# Patient Record
Sex: FEMALE | Race: WHITE | NOT HISPANIC OR LATINO | URBAN - METROPOLITAN AREA
[De-identification: names, ages, dates, MRNs, and addresses within clinical notes are randomized per-mention and may not be internally consistent; named-entity substitution may affect disease eponyms.]

---

## 2018-06-29 ENCOUNTER — INPATIENT (INPATIENT)
Facility: HOSPITAL | Age: 83
LOS: 3 days | Discharge: HOME | DRG: 690 | End: 2018-07-03
Attending: INTERNAL MEDICINE | Admitting: HOSPITALIST
Payer: MEDICARE

## 2018-06-29 VITALS
DIASTOLIC BLOOD PRESSURE: 93 MMHG | SYSTOLIC BLOOD PRESSURE: 173 MMHG | RESPIRATION RATE: 18 BRPM | TEMPERATURE: 98 F | OXYGEN SATURATION: 95 % | HEART RATE: 100 BPM

## 2018-06-29 DIAGNOSIS — Z96.60 PRESENCE OF UNSPECIFIED ORTHOPEDIC JOINT IMPLANT: Chronic | ICD-10-CM

## 2018-06-29 DIAGNOSIS — S22.000A WEDGE COMPRESSION FRACTURE OF UNSPECIFIED THORACIC VERTEBRA, INITIAL ENCOUNTER FOR CLOSED FRACTURE: ICD-10-CM
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
## 2018-06-29 DIAGNOSIS — S22.009A UNSPECIFIED FRACTURE OF UNSPECIFIED THORACIC VERTEBRA, INITIAL ENCOUNTER FOR CLOSED FRACTURE: ICD-10-CM

## 2018-06-29 DIAGNOSIS — Z29.9 ENCOUNTER FOR PROPHYLACTIC MEASURES, UNSPECIFIED: ICD-10-CM

## 2018-06-29 DIAGNOSIS — K21.9 GASTRO-ESOPHAGEAL REFLUX DISEASE WITHOUT ESOPHAGITIS: ICD-10-CM

## 2018-06-29 DIAGNOSIS — G31.84 MILD COGNITIVE IMPAIRMENT OF UNCERTAIN OR UNKNOWN ETIOLOGY: ICD-10-CM

## 2018-06-29 DIAGNOSIS — N39.0 URINARY TRACT INFECTION, SITE NOT SPECIFIED: ICD-10-CM

## 2018-06-29 DIAGNOSIS — D32.9 BENIGN NEOPLASM OF MENINGES, UNSPECIFIED: ICD-10-CM

## 2018-06-29 DIAGNOSIS — Z90.710 ACQUIRED ABSENCE OF BOTH CERVIX AND UTERUS: Chronic | ICD-10-CM

## 2018-06-29 DIAGNOSIS — Z98.89 OTHER SPECIFIED POSTPROCEDURAL STATES: Chronic | ICD-10-CM

## 2018-06-29 LAB
ALBUMIN SERPL ELPH-MCNC: 4.4 G/DL — SIGNIFICANT CHANGE UP (ref 3.3–5)
ALP SERPL-CCNC: 97 U/L — SIGNIFICANT CHANGE UP (ref 40–120)
ALT FLD-CCNC: 15 U/L — SIGNIFICANT CHANGE UP (ref 10–45)
ANION GAP SERPL CALC-SCNC: 15 MMOL/L — SIGNIFICANT CHANGE UP (ref 5–17)
APPEARANCE UR: ABNORMAL
APTT BLD: 28.4 SEC — SIGNIFICANT CHANGE UP (ref 27.5–37.4)
AST SERPL-CCNC: 34 U/L — SIGNIFICANT CHANGE UP (ref 10–40)
BILIRUB SERPL-MCNC: 0.6 MG/DL — SIGNIFICANT CHANGE UP (ref 0.2–1.2)
BILIRUB UR-MCNC: NEGATIVE — SIGNIFICANT CHANGE UP
BUN SERPL-MCNC: 10 MG/DL — SIGNIFICANT CHANGE UP (ref 7–23)
CALCIUM SERPL-MCNC: 9.4 MG/DL — SIGNIFICANT CHANGE UP (ref 8.4–10.5)
CHLORIDE SERPL-SCNC: 99 MMOL/L — SIGNIFICANT CHANGE UP (ref 96–108)
CO2 SERPL-SCNC: 22 MMOL/L — SIGNIFICANT CHANGE UP (ref 22–31)
COLOR SPEC: SIGNIFICANT CHANGE UP
CREAT SERPL-MCNC: 0.83 MG/DL — SIGNIFICANT CHANGE UP (ref 0.5–1.3)
DIFF PNL FLD: NEGATIVE — SIGNIFICANT CHANGE UP
GLUCOSE SERPL-MCNC: 143 MG/DL — HIGH (ref 70–99)
GLUCOSE UR QL: 50 MG/DL
HCT VFR BLD CALC: 43.8 % — SIGNIFICANT CHANGE UP (ref 34.5–45)
HGB BLD-MCNC: 14.5 G/DL — SIGNIFICANT CHANGE UP (ref 11.5–15.5)
INR BLD: 1.01 RATIO — SIGNIFICANT CHANGE UP (ref 0.88–1.16)
KETONES UR-MCNC: NEGATIVE — SIGNIFICANT CHANGE UP
LEUKOCYTE ESTERASE UR-ACNC: ABNORMAL
MCHC RBC-ENTMCNC: 32.6 PG — SIGNIFICANT CHANGE UP (ref 27–34)
MCHC RBC-ENTMCNC: 33 GM/DL — SIGNIFICANT CHANGE UP (ref 32–36)
MCV RBC AUTO: 98.7 FL — SIGNIFICANT CHANGE UP (ref 80–100)
NITRITE UR-MCNC: NEGATIVE — SIGNIFICANT CHANGE UP
PH UR: 7 — SIGNIFICANT CHANGE UP (ref 5–8)
PLATELET # BLD AUTO: 252 K/UL — SIGNIFICANT CHANGE UP (ref 150–400)
POTASSIUM SERPL-MCNC: 5.4 MMOL/L — HIGH (ref 3.5–5.3)
POTASSIUM SERPL-SCNC: 5.4 MMOL/L — HIGH (ref 3.5–5.3)
PROT SERPL-MCNC: 7.6 G/DL — SIGNIFICANT CHANGE UP (ref 6–8.3)
PROT UR-MCNC: NEGATIVE — SIGNIFICANT CHANGE UP
PROTHROM AB SERPL-ACNC: 10.9 SEC — SIGNIFICANT CHANGE UP (ref 9.8–12.7)
RBC # BLD: 4.44 M/UL — SIGNIFICANT CHANGE UP (ref 3.8–5.2)
RBC # FLD: 12.7 % — SIGNIFICANT CHANGE UP (ref 10.3–14.5)
SODIUM SERPL-SCNC: 136 MMOL/L — SIGNIFICANT CHANGE UP (ref 135–145)
SP GR SPEC: 1.01 — SIGNIFICANT CHANGE UP (ref 1.01–1.02)
UROBILINOGEN FLD QL: NEGATIVE — SIGNIFICANT CHANGE UP
WBC # BLD: 13.3 K/UL — HIGH (ref 3.8–10.5)
WBC # FLD AUTO: 13.3 K/UL — HIGH (ref 3.8–10.5)

## 2018-06-29 PROCEDURE — 99285 EMERGENCY DEPT VISIT HI MDM: CPT | Mod: 25

## 2018-06-29 PROCEDURE — 71250 CT THORAX DX C-: CPT | Mod: 26

## 2018-06-29 PROCEDURE — 71045 X-RAY EXAM CHEST 1 VIEW: CPT | Mod: 26

## 2018-06-29 PROCEDURE — 93010 ELECTROCARDIOGRAM REPORT: CPT

## 2018-06-29 PROCEDURE — 70450 CT HEAD/BRAIN W/O DYE: CPT | Mod: 26

## 2018-06-29 PROCEDURE — 72170 X-RAY EXAM OF PELVIS: CPT | Mod: 26

## 2018-06-29 PROCEDURE — 99223 1ST HOSP IP/OBS HIGH 75: CPT

## 2018-06-29 PROCEDURE — 72125 CT NECK SPINE W/O DYE: CPT | Mod: 26

## 2018-06-29 RX ORDER — LANOLIN ALCOHOL/MO/W.PET/CERES
5 CREAM (GRAM) TOPICAL AT BEDTIME
Qty: 0 | Refills: 0 | Status: DISCONTINUED | OUTPATIENT
Start: 2018-06-29 | End: 2018-07-03

## 2018-06-29 RX ORDER — FLUCONAZOLE 150 MG/1
200 TABLET ORAL DAILY
Qty: 0 | Refills: 0 | Status: DISCONTINUED | OUTPATIENT
Start: 2018-06-29 | End: 2018-07-01

## 2018-06-29 RX ORDER — LACTOBACILLUS ACIDOPHILUS 100MM CELL
1 CAPSULE ORAL DAILY
Qty: 0 | Refills: 0 | Status: DISCONTINUED | OUTPATIENT
Start: 2018-06-29 | End: 2018-07-03

## 2018-06-29 RX ORDER — SODIUM CHLORIDE 9 MG/ML
1000 INJECTION INTRAMUSCULAR; INTRAVENOUS; SUBCUTANEOUS ONCE
Qty: 0 | Refills: 0 | Status: COMPLETED | OUTPATIENT
Start: 2018-06-29 | End: 2018-06-29

## 2018-06-29 RX ORDER — OXYCODONE HYDROCHLORIDE 5 MG/1
5 TABLET ORAL ONCE
Qty: 0 | Refills: 0 | Status: DISCONTINUED | OUTPATIENT
Start: 2018-06-29 | End: 2018-06-29

## 2018-06-29 RX ORDER — FLUCONAZOLE 150 MG/1
200 TABLET ORAL ONCE
Qty: 0 | Refills: 0 | Status: COMPLETED | OUTPATIENT
Start: 2018-06-29 | End: 2018-06-29

## 2018-06-29 RX ORDER — ACETAMINOPHEN 500 MG
1000 TABLET ORAL ONCE
Qty: 0 | Refills: 0 | Status: COMPLETED | OUTPATIENT
Start: 2018-06-29 | End: 2018-06-29

## 2018-06-29 RX ORDER — ESOMEPRAZOLE MAGNESIUM 40 MG/1
0 CAPSULE, DELAYED RELEASE ORAL
Qty: 0 | Refills: 0 | COMMUNITY

## 2018-06-29 RX ORDER — OXYCODONE HYDROCHLORIDE 5 MG/1
5 TABLET ORAL EVERY 6 HOURS
Qty: 0 | Refills: 0 | Status: DISCONTINUED | OUTPATIENT
Start: 2018-06-29 | End: 2018-06-30

## 2018-06-29 RX ORDER — ENOXAPARIN SODIUM 100 MG/ML
40 INJECTION SUBCUTANEOUS EVERY 24 HOURS
Qty: 0 | Refills: 0 | Status: DISCONTINUED | OUTPATIENT
Start: 2018-06-29 | End: 2018-07-03

## 2018-06-29 RX ORDER — CEFTRIAXONE 500 MG/1
1 INJECTION, POWDER, FOR SOLUTION INTRAMUSCULAR; INTRAVENOUS EVERY 24 HOURS
Qty: 0 | Refills: 0 | Status: DISCONTINUED | OUTPATIENT
Start: 2018-06-29 | End: 2018-07-03

## 2018-06-29 RX ORDER — CEFTRIAXONE 500 MG/1
1 INJECTION, POWDER, FOR SOLUTION INTRAMUSCULAR; INTRAVENOUS ONCE
Qty: 0 | Refills: 0 | Status: COMPLETED | OUTPATIENT
Start: 2018-06-29 | End: 2018-06-29

## 2018-06-29 RX ORDER — ACETAMINOPHEN 500 MG
650 TABLET ORAL EVERY 6 HOURS
Qty: 0 | Refills: 0 | Status: DISCONTINUED | OUTPATIENT
Start: 2018-06-29 | End: 2018-07-03

## 2018-06-29 RX ORDER — PANTOPRAZOLE SODIUM 20 MG/1
40 TABLET, DELAYED RELEASE ORAL
Qty: 0 | Refills: 0 | Status: DISCONTINUED | OUTPATIENT
Start: 2018-06-29 | End: 2018-07-03

## 2018-06-29 RX ADMIN — CEFTRIAXONE 100 GRAM(S): 500 INJECTION, POWDER, FOR SOLUTION INTRAMUSCULAR; INTRAVENOUS at 17:40

## 2018-06-29 RX ADMIN — SODIUM CHLORIDE 1000 MILLILITER(S): 9 INJECTION INTRAMUSCULAR; INTRAVENOUS; SUBCUTANEOUS at 17:43

## 2018-06-29 RX ADMIN — Medication 650 MILLIGRAM(S): at 22:30

## 2018-06-29 RX ADMIN — Medication 400 MILLIGRAM(S): at 17:30

## 2018-06-29 RX ADMIN — Medication 5 MILLIGRAM(S): at 21:41

## 2018-06-29 RX ADMIN — Medication 650 MILLIGRAM(S): at 21:42

## 2018-06-29 RX ADMIN — Medication 1000 MILLIGRAM(S): at 18:05

## 2018-06-29 RX ADMIN — OXYCODONE HYDROCHLORIDE 5 MILLIGRAM(S): 5 TABLET ORAL at 20:22

## 2018-06-29 RX ADMIN — FLUCONAZOLE 200 MILLIGRAM(S): 150 TABLET ORAL at 17:52

## 2018-06-29 NOTE — ED PROVIDER NOTE - ENMT, MLM
Airway patent, Nasal mucosa clear. Mouth with normal mucosa. Throat has no vesicles, no oropharyngeal exudates and uvula is midline. NO scalp tenderness, no hematoma, no ecchymosis. No C-spine tenderness Airway patent, Nasal mucosa clear. Mouth with normal mucosa. No tongue laceration. Throat has no vesicles, no oropharyngeal exudates and uvula is midline. NO scalp tenderness, no hematoma, no ecchymosis. No C-spine tenderness
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
## 2018-06-29 NOTE — H&P ADULT - PROBLEM SELECTOR PLAN 3
- ED discussed incidental finding of meningioma with neurosurgical team. Team determined there was no need for acute neurosurgical intervention  - follow up neurosurgical recommendations?? - CT head, neck did not reveal ICH or fracture. CT chest identified a compression fracture at T9, age indeterminate  - follow up orthopedic recommendations regarding benefit for brace to address pain   - start fall precautions, bed alarm to reduce risk of falls - CT head, neck did not reveal ICH or fracture. CT chest identified a compression fracture at T9, age indeterminate  - will consult patient's orthopedic surgeon for recommendations regarding benefit for brace to address pain   - start calcium and vitamin D  - start fall precautions, bed alarm to reduce risk of falls  - patient should have DEXA scan to determine level of bone mineralization, she may be a candidate for bisphosphonate therapy - CT head, neck did not reveal ICH or fracture. CT chest identified a compression fracture at T9, age indeterminate  - will start lidocaine patches for pain, WBAT, and check for pain, if severe- will have orthopedic team assess for need for brace  - start calcium and vitamin D  - start fall precautions, bed alarm to reduce risk of falls  - patient should have DEXA scan as outpatient to determine level of bone mineralization, she may be a candidate for bisphosphonate therapy

## 2018-06-29 NOTE — H&P ADULT - NSHPPHYSICALEXAM_GEN_ALL_CORE
T(C): 36.9 (06-29-18 @ 20:55), Max: 37.2 (06-29-18 @ 19:31)  HR: 82 (06-29-18 @ 20:55) (77 - 100)  BP: 170/91 (06-29-18 @ 20:55) (166/79 - 192/93)  RR: 18 (06-29-18 @ 20:55) (17 - 22)  SpO2: 95% (06-29-18 @ 20:55) (95% - 97%)  Wt(kg): --Vital Signs Last 24 Hrs  T(C): 36.9 (29 Jun 2018 20:55), Max: 37.2 (29 Jun 2018 19:31)  T(F): 98.4 (29 Jun 2018 20:55), Max: 98.9 (29 Jun 2018 19:31)  HR: 82 (29 Jun 2018 20:55) (77 - 100)  BP: 170/91 (29 Jun 2018 20:55) (166/79 - 192/93)  BP(mean): --  RR: 18 (29 Jun 2018 20:55) (17 - 22)  SpO2: 95% (29 Jun 2018 20:55) (95% - 97%)    PHYSICAL EXAM:  GENERAL: NAD, well-groomed, well-developed  HEAD:  Atraumatic, Normocephalic  EYES: EOMI, PERRLA, conjunctiva and sclera clear  ENMT: No oropharyngeal lesions, Moist mucous membranes, Good dentition, No lesions  NECK: Supple, no cervical lymphadenopathy  NERVOUS SYSTEM:  Alert & Oriented X name, year, location (hospital, president), Motor Strength 5/5 B/L upper and lower extremities;  full sensation to light touch  CHEST/LUNG: Clear to percussion bilaterally; No rales, rhonchi, wheezing, or rubs  HEART: Regular rate and rhythm; No murmurs, rubs, or gallops  ABDOMEN: Soft, Nontender, Nondistended; Bowel sounds present  EXTREMITIES:  2+ Peripheral Pulses, No clubbing, cyanosis, or edema  MSK: no cervical tenderness, mild chest wall tenderness to palpation, no step-off or focal tenderness of the spine, significant discomfort with flexion of L flexors, normal ROM of RLE,

## 2018-06-29 NOTE — H&P ADULT - PROBLEM SELECTOR PLAN 1
Patient has a positive UA. Patient has no history of recurrent UTI, recent instrumentation to suggest risk for complicated UTI.   - continue ceftriaxone 1g IV qd  - follow up Ucx results  - start probiotic Patient has a positive UA. Patient has no history of recurrent UTI, recent instrumentation to suggest risk for complicated UTI.   Given that the patient is currently symptomatic and was found to have budding yeast-like cells in UA, will continue coverage with fluconazole  - continue ceftriaxone 1g IV qd, fluconazole 200mg PO qd  - follow up Ucx results  - start probiotic

## 2018-06-29 NOTE — ED PROVIDER NOTE - ATTENDING CONTRIBUTION TO CARE
Attending MD Castaneda: I personally have seen and examined this patient.  Resident note reviewed and agree on plan of care and except where noted.  See below for details.     Daughter, RN, contributing to HPI    89F with PMH including GERD, "cognitive decline" (?dementia) presents to the ED with chest and back pain after fall.  Reports was opening refrigerator when cookie box fell and she fell backwards onto wood floor and hit head.  Unclear if there was loss of balance prior to fall of box.  Reports she was placed in chair by family, unable to ambulate since fall.  Denies LOC, headache.  Denies shortness of breath.  Denies abdominal pain, nausea, vomiting, diarrhea.  Denies fevers, chills, recent illness.  Patient minimally contributor to history, reports secondary to pain.  On exam, head NCAT, PERRL, FROM at neck, diffuse tenderness to palpation or stepoffs along length of spine, lungs CTAB with good inspiratory effort, +S1S2, no m/r/g, abdomen soft with +BS, NT, ND, no CVAT, moving all extremities; A/P: 89F with fall, ?mechanical vs underlying infection, will obtain CT head, CT C spine, CT Chest, CXR, pelvis, labs, UA, IVFs, reassess

## 2018-06-29 NOTE — ED PROVIDER NOTE - MEDICAL DECISION MAKING DETAILS
89 yo PMH GERD and cognitive decline presenting with chest and back pain in the setting of an unwitnessed mechanical fall without LOC. Patient without neuro deficits, no headache, no head pain. EKG without ST changes. CXR shows clear lungs. Chest pain likely musculoskeletal in nature, less likely cardiac related given unremarkable EKG and CXR. Awaiting CT head, neck, and chest for further evaluation. CBC shows WBC 13.3. UA and urine culture sent.

## 2018-06-29 NOTE — H&P ADULT - NSHPSOCIALHISTORY_GEN_ALL_CORE
This patient lives with her daughter and son-in-law.  She has no history of tobacco or illicit drug use.  She drinks EtOH on rare occasions, socially.
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
## 2018-06-29 NOTE — ED ADULT NURSE NOTE - PSH
S/P hip replacement  right  S/P ORIF (open reduction internal fixation) fracture  L hip, s/p fracture  S/P TUAN-BSO (total abdominal hysterectomy and bilateral salpingo-oophorectomy)

## 2018-06-29 NOTE — H&P ADULT - PROBLEM SELECTOR PLAN 4
- lovenox subQ - ED discussed incidental finding of meningioma with neurosurgical team. Team determined there was no need for acute neurosurgical intervention  - follow up neurosurgical recommendations - ED team, Dr. Castaneda discussed incidental finding of meningioma with neurosurgical team. Because of the meningioma's calcified appearance and lack of neurologic symptoms, no acute neurosurgical intervention required at this time. Findings were discussed with the patient and her daughter.  The patient is advised to follow up with her primary care doctor for repeat imaging.

## 2018-06-29 NOTE — ED PROVIDER NOTE - PROGRESS NOTE DETAILS
Attending MD Castaneda: SPoke with neurosurgery, Dr. Gulshan Ferrera, re: parafalcine meningioma, images reviewed, appears calcified, given lack of neuro symptoms, no acute neurosurgical intervention Attending MD Castaneda: Call placed to Dr. Bhavin Gardner [phone] Attending MD Castaneda: Spoke with Dr. Gardner, reports patient rarely seeks hospital care, agrees with concern, will admit patient to hospitalist. Hospitalist paged.

## 2018-06-29 NOTE — H&P ADULT - NSHPREVIEWOFSYSTEMS_GEN_ALL_CORE
REVIEW OF SYSTEMS  CONSTITUTIONAL: No fever, no chills  EYES: No eye pain, does not wear glasses  ENMT:  No difficulty hearing, No throat pain  NECK: No pain or stiffness  RESPIRATORY: No cough, no wheezing, no chills, No shortness of breath  CARDIOVASCULAR: + chest pain with deep inhalation, no palpitations, no leg swelling  GASTROINTESTINAL: No abdominal or epigastric pain. No nausea, vomiting, No diarrhea or constipation.   GENITOURINARY: No dysuria, + frequency, No hematuria, +malodorous urine  NEUROLOGICAL: No headaches, no numbness, no tremors  SKIN: No itching, burning, rashes, or lesions   MUSCULOSKELETAL: + Back pain  HEME/LYMPH: No bruising or bleeding

## 2018-06-29 NOTE — ED ADULT NURSE NOTE - OBJECTIVE STATEMENT
Patient presents to Ed via amb with c/o fall. Patient reports she was opening her refrigerator when a box of fell off  the top and caused her to fall backwards hitting the side of her head. Patient A&Ox3 denies loc, c/o chest and back   pain no sob no nausea or vomiting no fever chills headache or dizziness. Full strength and sensation to b/l upper and  lower ext, no visible open wounds. RAC 20g iv lock placed labs drawn and sent.
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
## 2018-06-29 NOTE — ED PROVIDER NOTE - MUSCULOSKELETAL, MLM
Spine appears normal, range of motion is not limited. No c-spine tenderness, no midline back tenderness

## 2018-06-29 NOTE — ED PROVIDER NOTE - OBJECTIVE STATEMENT
89 yo F PMH GERD, cognitive decline presenting with chest and back pain s/p mechanical fall. Pt was opening fridge door and startled by falling cookie box and fell backwards onto wood floor and hit head. No LOC 89 yo F PMH GERD, cognitive decline presenting with chest and back pain s/p mechanical fall. Pt was opening fridge door and startled by falling cookie box and fell backwards onto wood floor and hit head. No LOC, no headache, no dizziness, SOB. No prior dizziness, lightheadedness, palpitations prior to fall. Pt transferred to chair by family but unable to get up from chair due to chest and back pain. Pain worsens with movement and touch. no abd pain, nausea or vomiting.
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
## 2018-06-29 NOTE — H&P ADULT - NSHPLABSRESULTS_GEN_ALL_CORE
LABS:                        14.5   13.3  )-----------( 252      ( 2018 14:42 )             43.8         136  |  99  |  10  ----------------------------<  143<H>  5.4<H>   |  22  |  0.83    Ca    9.4      2018 14:42    TPro  7.6  /  Alb  4.4  /  TBili  0.6  /  DBili  x   /  AST  34  /  ALT  15  /  AlkPhos  97      PT/INR - ( 2018 14:42 )   PT: 10.9 sec;   INR: 1.01 ratio         PTT - ( 2018 14:42 )  PTT:28.4 sec  Urinalysis Basic - ( 2018 15:10 )    Color: PL Yellow / Appearance: SL Turbid / S.011 / pH: x  Gluc: x / Ketone: Negative  / Bili: Negative / Urobili: Negative   Blood: x / Protein: Negative / Nitrite: Negative   Leuk Esterase: Large / RBC: 2-5 /HPF / WBC >50 /HPF   Sq Epi: x / Non Sq Epi: Occasional /HPF / Bacteria: Few /HPF      CAPILLARY BLOOD GLUCOSE      Urinalysis Basic - ( 2018 15:10 )    Color: PL Yellow / Appearance: SL Turbid / S.011 / pH: x  Gluc: x / Ketone: Negative  / Bili: Negative / Urobili: Negative   Blood: x / Protein: Negative / Nitrite: Negative   Leuk Esterase: Large / RBC: 2-5 /HPF / WBC >50 /HPF   Sq Epi: x / Non Sq Epi: Occasional /HPF / Bacteria: Few /HPF      RADIOLOGY & ADDITIONAL TESTS:    Imaging Personally Reviewed:  [ x] YES  [ ] NO    CBC is notable for elevated WBC of 13.3, with neutrophil predominance. INR, PT and PTT are within normal limits. CMP is unremarkable except for slightly elevated K of 5.4, found to be from a hemolyzed sample. UA is positive for leukocyte esterase, WBC and bacteria, with few budding yeast-like cells.     SEE full report CXR found normal heart size, clear lungs, no pneumothorax.  Xray pelvis- no displaced fracture, chornic deformities seen. Arthrosis noted. Lower lumbar spondylosis seen. Astherosclerosis seen.    CT head found 1.3x 0.8cm L paracfalcine meningioma noted. No ICH or calvarial fracture. No  C-spine fracture or subluxation.     CT chest found compression fracture T9, age indeterminate. Bibasilar subsegmental atelectasis is seen. LABS:                        14.5   13.3  )-----------( 252      ( 2018 14:42 )             43.8         136  |  99  |  10  ----------------------------<  143<H>  5.4<H>   |  22  |  0.83    Ca    9.4      2018 14:42    TPro  7.6  /  Alb  4.4  /  TBili  0.6  /  DBili  x   /  AST  34  /  ALT  15  /  AlkPhos  97      PT/INR - ( 2018 14:42 )   PT: 10.9 sec;   INR: 1.01 ratio         PTT - ( 2018 14:42 )  PTT:28.4 sec  Urinalysis Basic - ( 2018 15:10 )    Color: PL Yellow / Appearance: SL Turbid / S.011 / pH: x  Gluc: x / Ketone: Negative  / Bili: Negative / Urobili: Negative   Blood: x / Protein: Negative / Nitrite: Negative   Leuk Esterase: Large / RBC: 2-5 /HPF / WBC >50 /HPF   Sq Epi: x / Non Sq Epi: Occasional /HPF / Bacteria: Few /HPF      CAPILLARY BLOOD GLUCOSE      Urinalysis Basic - ( 2018 15:10 )    Color: PL Yellow / Appearance: SL Turbid / S.011 / pH: x  Gluc: x / Ketone: Negative  / Bili: Negative / Urobili: Negative   Blood: x / Protein: Negative / Nitrite: Negative   Leuk Esterase: Large / RBC: 2-5 /HPF / WBC >50 /HPF   Sq Epi: x / Non Sq Epi: Occasional /HPF / Bacteria: Few /HPF      RADIOLOGY & ADDITIONAL TESTS:    Imaging Personally Reviewed:  [ x] YES  [ ] NO    CBC is notable for elevated WBC of 13.3, with neutrophil predominance. INR, PT and PTT are within normal limits. CMP is unremarkable except for slightly elevated K of 5.4, found to be from a hemolyzed sample. UA is positive for leukocyte esterase, WBC and bacteria, with few budding yeast-like cells.   CXR found normal heart size, clear lungs, no pneumothorax.  Xray pelvis- no displaced fracture, chronic deformities seen. Arthrosis noted. Lower lumbar spondylosis seen. Astherosclerosis seen.    CT head found 1.3x 0.8cm L parafalcine meningioma noted. No ICH or calvarial fracture. No  C-spine fracture or subluxation.     CT chest found compression fracture T9, age indeterminate. Bibasilar subsegmental atelectasis is seen.    EKG NSR , with TWI in III, left axis deviation

## 2018-06-29 NOTE — H&P ADULT - PROBLEM SELECTOR PLAN 2
- CT head, neck did not reveal ICH or fracture. CT chest identified a compression fracture at T9, age indeterminate  - PT consult in AM   - start fall precautions, bed alarm to reduce risk of falls - manage pain with oxycodone IR 5mg for severe pain, tylenol for mild-moderate pain  - PT consult in AM   - start fall precautions, bed alarm to reduce risk of falls - manage pain with oxycodone IR 5mg for severe pain, tylenol for mild-moderate pain  - start fall precautions, bed alarm to reduce risk of falls

## 2018-06-30 LAB
ANION GAP SERPL CALC-SCNC: 16 MMOL/L — SIGNIFICANT CHANGE UP (ref 5–17)
BUN SERPL-MCNC: 9 MG/DL — SIGNIFICANT CHANGE UP (ref 7–23)
CALCIUM SERPL-MCNC: 9.4 MG/DL — SIGNIFICANT CHANGE UP (ref 8.4–10.5)
CHLORIDE SERPL-SCNC: 99 MMOL/L — SIGNIFICANT CHANGE UP (ref 96–108)
CO2 SERPL-SCNC: 23 MMOL/L — SIGNIFICANT CHANGE UP (ref 22–31)
CREAT SERPL-MCNC: 0.82 MG/DL — SIGNIFICANT CHANGE UP (ref 0.5–1.3)
GLUCOSE SERPL-MCNC: 128 MG/DL — HIGH (ref 70–99)
HCT VFR BLD CALC: 42.9 % — SIGNIFICANT CHANGE UP (ref 34.5–45)
HGB BLD-MCNC: 13.7 G/DL — SIGNIFICANT CHANGE UP (ref 11.5–15.5)
MCHC RBC-ENTMCNC: 31.1 PG — SIGNIFICANT CHANGE UP (ref 27–34)
MCHC RBC-ENTMCNC: 31.9 GM/DL — LOW (ref 32–36)
MCV RBC AUTO: 97.5 FL — SIGNIFICANT CHANGE UP (ref 80–100)
PLATELET # BLD AUTO: 250 K/UL — SIGNIFICANT CHANGE UP (ref 150–400)
POTASSIUM SERPL-MCNC: 4.7 MMOL/L — SIGNIFICANT CHANGE UP (ref 3.5–5.3)
POTASSIUM SERPL-SCNC: 4.7 MMOL/L — SIGNIFICANT CHANGE UP (ref 3.5–5.3)
RBC # BLD: 4.4 M/UL — SIGNIFICANT CHANGE UP (ref 3.8–5.2)
RBC # FLD: 14.9 % — HIGH (ref 10.3–14.5)
SODIUM SERPL-SCNC: 138 MMOL/L — SIGNIFICANT CHANGE UP (ref 135–145)
WBC # BLD: 13.22 K/UL — HIGH (ref 3.8–10.5)
WBC # FLD AUTO: 13.22 K/UL — HIGH (ref 3.8–10.5)

## 2018-06-30 PROCEDURE — 99233 SBSQ HOSP IP/OBS HIGH 50: CPT

## 2018-06-30 RX ORDER — DOCUSATE SODIUM 100 MG
100 CAPSULE ORAL THREE TIMES A DAY
Qty: 0 | Refills: 0 | Status: DISCONTINUED | OUTPATIENT
Start: 2018-06-30 | End: 2018-07-03

## 2018-06-30 RX ORDER — CALCIUM CARBONATE 500(1250)
1 TABLET ORAL DAILY
Qty: 0 | Refills: 0 | Status: DISCONTINUED | OUTPATIENT
Start: 2018-06-30 | End: 2018-07-03

## 2018-06-30 RX ORDER — LIDOCAINE 4 G/100G
1 CREAM TOPICAL DAILY
Qty: 0 | Refills: 0 | Status: DISCONTINUED | OUTPATIENT
Start: 2018-06-30 | End: 2018-07-03

## 2018-06-30 RX ORDER — SENNA PLUS 8.6 MG/1
2 TABLET ORAL AT BEDTIME
Qty: 0 | Refills: 0 | Status: DISCONTINUED | OUTPATIENT
Start: 2018-06-30 | End: 2018-07-03

## 2018-06-30 RX ORDER — HYDRALAZINE HCL 50 MG
10 TABLET ORAL ONCE
Qty: 0 | Refills: 0 | Status: COMPLETED | OUTPATIENT
Start: 2018-06-30 | End: 2018-06-30

## 2018-06-30 RX ADMIN — PANTOPRAZOLE SODIUM 40 MILLIGRAM(S): 20 TABLET, DELAYED RELEASE ORAL at 04:58

## 2018-06-30 RX ADMIN — FLUCONAZOLE 200 MILLIGRAM(S): 150 TABLET ORAL at 13:15

## 2018-06-30 RX ADMIN — ENOXAPARIN SODIUM 40 MILLIGRAM(S): 100 INJECTION SUBCUTANEOUS at 13:15

## 2018-06-30 RX ADMIN — OXYCODONE HYDROCHLORIDE 5 MILLIGRAM(S): 5 TABLET ORAL at 04:57

## 2018-06-30 RX ADMIN — Medication 100 MILLIGRAM(S): at 21:51

## 2018-06-30 RX ADMIN — SENNA PLUS 2 TABLET(S): 8.6 TABLET ORAL at 21:51

## 2018-06-30 RX ADMIN — CEFTRIAXONE 100 GRAM(S): 500 INJECTION, POWDER, FOR SOLUTION INTRAMUSCULAR; INTRAVENOUS at 17:14

## 2018-06-30 RX ADMIN — Medication 1 TABLET(S): at 13:15

## 2018-06-30 RX ADMIN — Medication 5 MILLIGRAM(S): at 21:51

## 2018-06-30 RX ADMIN — LIDOCAINE 1 PATCH: 4 CREAM TOPICAL at 13:20

## 2018-06-30 RX ADMIN — OXYCODONE HYDROCHLORIDE 5 MILLIGRAM(S): 5 TABLET ORAL at 05:30

## 2018-06-30 RX ADMIN — Medication 650 MILLIGRAM(S): at 13:15

## 2018-06-30 RX ADMIN — Medication 100 MILLIGRAM(S): at 13:23

## 2018-06-30 RX ADMIN — Medication 650 MILLIGRAM(S): at 14:15

## 2018-06-30 RX ADMIN — Medication 10 MILLIGRAM(S): at 21:51

## 2018-06-30 NOTE — PROGRESS NOTE ADULT - PROBLEM SELECTOR PLAN 1
Patient has a positive UA. Patient has no history of recurrent UTI, recent instrumentation to suggest risk for complicated UTI.   Given that the patient is currently symptomatic and was found to have budding yeast-like cells in UA, will continue coverage with fluconazole  - continue ceftriaxone 1g IV qd, fluconazole 200mg PO qd  - follow up Ucx results  - c/w probiotic

## 2018-06-30 NOTE — PROGRESS NOTE ADULT - ATTENDING COMMENTS
Plan d/w patient at bedside and NP (Ann). Need to d/w family further, unable to reach today.     Rigoberto Santana M.D.  Hospitalist  Pager: [phone]

## 2018-07-01 DIAGNOSIS — R07.9 CHEST PAIN, UNSPECIFIED: ICD-10-CM

## 2018-07-01 DIAGNOSIS — R03.0 ELEVATED BLOOD-PRESSURE READING, WITHOUT DIAGNOSIS OF HYPERTENSION: ICD-10-CM

## 2018-07-01 LAB
-  AMIKACIN: SIGNIFICANT CHANGE UP
-  AMOXICILLIN/CLAVULANIC ACID: SIGNIFICANT CHANGE UP
-  AMPICILLIN/SULBACTAM: SIGNIFICANT CHANGE UP
-  AMPICILLIN: SIGNIFICANT CHANGE UP
-  AZTREONAM: SIGNIFICANT CHANGE UP
-  CEFAZOLIN: SIGNIFICANT CHANGE UP
-  CEFEPIME: SIGNIFICANT CHANGE UP
-  CEFOXITIN: SIGNIFICANT CHANGE UP
-  CEFTRIAXONE: SIGNIFICANT CHANGE UP
-  CIPROFLOXACIN: SIGNIFICANT CHANGE UP
-  ERTAPENEM: SIGNIFICANT CHANGE UP
-  GENTAMICIN: SIGNIFICANT CHANGE UP
-  IMIPENEM: SIGNIFICANT CHANGE UP
-  LEVOFLOXACIN: SIGNIFICANT CHANGE UP
-  MEROPENEM: SIGNIFICANT CHANGE UP
-  NITROFURANTOIN: SIGNIFICANT CHANGE UP
-  PIPERACILLIN/TAZOBACTAM: SIGNIFICANT CHANGE UP
-  TIGECYCLINE: SIGNIFICANT CHANGE UP
-  TOBRAMYCIN: SIGNIFICANT CHANGE UP
-  TRIMETHOPRIM/SULFAMETHOXAZOLE: SIGNIFICANT CHANGE UP
CULTURE RESULTS: SIGNIFICANT CHANGE UP
METHOD TYPE: SIGNIFICANT CHANGE UP
ORGANISM # SPEC MICROSCOPIC CNT: SIGNIFICANT CHANGE UP
ORGANISM # SPEC MICROSCOPIC CNT: SIGNIFICANT CHANGE UP
SPECIMEN SOURCE: SIGNIFICANT CHANGE UP
TROPONIN T, HIGH SENSITIVITY RESULT: 16 NG/L — SIGNIFICANT CHANGE UP (ref 0–51)
TROPONIN T, HIGH SENSITIVITY RESULT: 18 NG/L — SIGNIFICANT CHANGE UP (ref 0–51)

## 2018-07-01 PROCEDURE — 93010 ELECTROCARDIOGRAM REPORT: CPT

## 2018-07-01 PROCEDURE — 99233 SBSQ HOSP IP/OBS HIGH 50: CPT

## 2018-07-01 RX ADMIN — CEFTRIAXONE 100 GRAM(S): 500 INJECTION, POWDER, FOR SOLUTION INTRAMUSCULAR; INTRAVENOUS at 16:47

## 2018-07-01 RX ADMIN — ENOXAPARIN SODIUM 40 MILLIGRAM(S): 100 INJECTION SUBCUTANEOUS at 12:37

## 2018-07-01 RX ADMIN — FLUCONAZOLE 200 MILLIGRAM(S): 150 TABLET ORAL at 12:06

## 2018-07-01 RX ADMIN — Medication 100 MILLIGRAM(S): at 05:02

## 2018-07-01 RX ADMIN — Medication 650 MILLIGRAM(S): at 13:00

## 2018-07-01 RX ADMIN — Medication 650 MILLIGRAM(S): at 12:37

## 2018-07-01 RX ADMIN — Medication 100 MILLIGRAM(S): at 14:51

## 2018-07-01 RX ADMIN — Medication 5 MILLIGRAM(S): at 21:24

## 2018-07-01 RX ADMIN — Medication 1 TABLET(S): at 12:06

## 2018-07-01 RX ADMIN — LIDOCAINE 1 PATCH: 4 CREAM TOPICAL at 01:30

## 2018-07-01 RX ADMIN — LIDOCAINE 1 PATCH: 4 CREAM TOPICAL at 12:37

## 2018-07-01 RX ADMIN — SENNA PLUS 2 TABLET(S): 8.6 TABLET ORAL at 21:23

## 2018-07-01 RX ADMIN — PANTOPRAZOLE SODIUM 40 MILLIGRAM(S): 20 TABLET, DELAYED RELEASE ORAL at 05:02

## 2018-07-01 RX ADMIN — Medication 100 MILLIGRAM(S): at 21:24

## 2018-07-01 NOTE — PROGRESS NOTE ADULT - SUBJECTIVE AND OBJECTIVE BOX
Patient is a 89y old  Female who presents with a chief complaint of fall at home (2018 20:15)    SUBJECTIVE / OVERNIGHT EVENTS: Patient seen and examined. Patient reports sharp, reproducible chest pain this AM. Denies back pain. Denies LE weakness, saddle anesthesia. She remains confused      REVIEW OF SYSTEMS    General: No fevers, chills  	  Ophthalmologic: No change in vision    Respiratory and Thorax: No SOB or cough  	  Cardiovascular: +chest pain, but no palpitations, or LE edema    Gastrointestinal: No abdominal pain, nausea, vomiting, or diarrhea    Genitourinary: No dysuria or polyuria    MEDICATIONS  (STANDING):  calcium carbonate 1250 mG (OsCal) 1 Tablet(s) Oral daily  cefTRIAXone   IVPB 1 Gram(s) IV Intermittent every 24 hours  docusate sodium 100 milliGRAM(s) Oral three times a day  enoxaparin Injectable 40 milliGRAM(s) SubCutaneous every 24 hours  fluconAZOLE   Tablet 200 milliGRAM(s) Oral daily  lactobacillus acidophilus 1 Tablet(s) Oral daily  melatonin 5 milliGRAM(s) Oral at bedtime  pantoprazole    Tablet 40 milliGRAM(s) Oral before breakfast  senna 2 Tablet(s) Oral at bedtime    MEDICATIONS  (PRN):  acetaminophen   Tablet. 650 milliGRAM(s) Oral every 6 hours PRN Moderate or severe Pain (4 - 6)  lidocaine   Patch 1 Patch Transdermal daily PRN back pain      I&O's Summary    2018 07:  -  2018 07:00  --------------------------------------------------------  IN: 460 mL / OUT: 1 mL / NET: 459 mL    2018 07:  -  2018 14:36  --------------------------------------------------------  IN: 440 mL / OUT: 0 mL / NET: 440 mL    Vital Signs Last 24 Hrs  T(C): 36.8 (2018 11:33), Max: 37.3 (2018 19:46)  T(F): 98.3 (2018 11:33), Max: 99.1 (2018 19:46)  HR: 89 (2018 11:33) (89 - 109)  BP: 167/91 (2018 11:33) (158/84 - 186/91)  BP(mean): --  RR: 18 (2018 11:33) (18 - 18)  SpO2: 97% (2018 11:33) (94% - 97%)    PHYSICAL EXAM:    Constitutional: Elderly, NAD, lying in bed, appears stated age    Eyes: EOMI, PERRLA, clear conjunctiva    ENMT: No pharyngeal erythema, mucus membranes moist    Neck: No JVD    Back: No spinal tenderness, +kyphosis    Respiratory: Lungs clear to auscultation     Cardiovascular: Regular rate and rhythm, S1S2+, no murmurs appreciated.  No LE edema    Gastrointestinal: Soft, non-tender, non-distended, bowel sounds positive all four quadrants    Extremities: no clubbing or cyanosis    Vascular: 2+ pulses radially and dorsalis pedis    Neurological: Alert and oriented to name only.  Cranial nerves II-XII intact.  No focal neurological deficits.  5/5 motor strength all four extremities    Skin: Warm and dry.  No rashes    Lymph Nodes: No cervical lymphadenopathy    Musculoskeletal: Sternal tenderness to palpation     Psychiatric: Pleasant affect    LABS:          ( @ 08:20)                      13.7  13.22 )-----------( 250                 42.9    Neutrophils = -- (--%)  Lymphocytes = -- (--%)  Eosinophils = -- (--%)  Basophils = -- (--%)  Monocytes = -- (--%)  Bands = --%        138  |  99  |  9   ----------------------------<  128<H>  4.7   |  23  |  0.82    Ca    9.4      2018 07:06    TPro  7.6  /  Alb  4.4  /  TBili  0.6  /  DBili  x   /  AST  34  /  ALT  15  /  AlkPhos  97      ( 2018 14:42 )   PT: 10.9 sec;   INR: 1.01 ratio;       PTT:28.4 sec              Urinalysis Basic - ( 2018 15:10 )    Color: PL Yellow / Appearance: SL Turbid / S.011 / pH: x  Gluc: x / Ketone: Negative  / Bili: Negative / Urobili: Negative   Blood: x / Protein: Negative / Nitrite: Negative   Leuk Esterase: Large / RBC: 2-5 /HPF / WBC >50 /HPF   Sq Epi: x / Non Sq Epi: Occasional /HPF / Bacteria: Few /HPF      RADIOLOGY & ADDITIONAL TESTS:    Imaging Personally Reviewed: [x]  Consultant(s) Notes Reviewed:    Care Discussed with Consultants/Other Providers:

## 2018-07-01 NOTE — PHYSICAL THERAPY INITIAL EVALUATION ADULT - ADDITIONAL COMMENTS
Pt. lives in [place] normally, however currently staying with her daughter in a house with steps to enter and her bedroom upstairs.

## 2018-07-01 NOTE — PROGRESS NOTE ADULT - PROBLEM SELECTOR PLAN 2
SBP > 180 last night, diastolic BP stable. She was given a one time dose of hydralazine 5 mg PO x 1. Reviewed outpatient records, reportedly not on antihypertensive medications at home.   - c/w monitoring for now (?pain related)  - Will d/w daughter (Autumn) to confirm medication reconciliation

## 2018-07-01 NOTE — PHYSICAL THERAPY INITIAL EVALUATION ADULT - PERTINENT HX OF CURRENT PROBLEM, REHAB EVAL
Pt adm s/p witnessed fall. +4 episodes of falling since 5/3/2018. Hosp Course: CXR negative; Pelvic XR negative; CT head negative for acute injury; CT chest +age indeterminate T9 compression fx; +UTI;

## 2018-07-01 NOTE — PROGRESS NOTE ADULT - PROBLEM SELECTOR PLAN 1
Patient with chest pain this AM. EKG personally reviewed, NSR at 63 bpm w/ no ST-T changes and QTc 412, no evidence of ischemia. hsTrop is 16 (borderline elevation for indeterminate). Will recheck in 3 hours to r/o ischemia.  - Chest pain improved this afternoon  - c/w monitoring

## 2018-07-01 NOTE — PROGRESS NOTE ADULT - ATTENDING COMMENTS
Plan d/w patient at bedside and NP (Chelsea).     Rigoberto Santana M.D.  Hospitalist  Pager: [phone] Plan d/w patient at bedside and NP (Chelsea). Patient recommended for GIACOMO upon discharge.     Rigoberto Santana M.D.  Hospitalist  Pager: [phone]
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
## 2018-07-02 LAB
ANION GAP SERPL CALC-SCNC: 15 MMOL/L — SIGNIFICANT CHANGE UP (ref 5–17)
BUN SERPL-MCNC: 20 MG/DL — SIGNIFICANT CHANGE UP (ref 7–23)
CALCIUM SERPL-MCNC: 9.5 MG/DL — SIGNIFICANT CHANGE UP (ref 8.4–10.5)
CHLORIDE SERPL-SCNC: 98 MMOL/L — SIGNIFICANT CHANGE UP (ref 96–108)
CO2 SERPL-SCNC: 22 MMOL/L — SIGNIFICANT CHANGE UP (ref 22–31)
CREAT SERPL-MCNC: 1 MG/DL — SIGNIFICANT CHANGE UP (ref 0.5–1.3)
GLUCOSE SERPL-MCNC: 135 MG/DL — HIGH (ref 70–99)
HCT VFR BLD CALC: 39.5 % — SIGNIFICANT CHANGE UP (ref 34.5–45)
HGB BLD-MCNC: 13 G/DL — SIGNIFICANT CHANGE UP (ref 11.5–15.5)
MCHC RBC-ENTMCNC: 31.3 PG — SIGNIFICANT CHANGE UP (ref 27–34)
MCHC RBC-ENTMCNC: 32.9 GM/DL — SIGNIFICANT CHANGE UP (ref 32–36)
MCV RBC AUTO: 95.2 FL — SIGNIFICANT CHANGE UP (ref 80–100)
PLATELET # BLD AUTO: 268 K/UL — SIGNIFICANT CHANGE UP (ref 150–400)
POTASSIUM SERPL-MCNC: 3.7 MMOL/L — SIGNIFICANT CHANGE UP (ref 3.5–5.3)
POTASSIUM SERPL-SCNC: 3.7 MMOL/L — SIGNIFICANT CHANGE UP (ref 3.5–5.3)
RBC # BLD: 4.15 M/UL — SIGNIFICANT CHANGE UP (ref 3.8–5.2)
RBC # FLD: 14.5 % — SIGNIFICANT CHANGE UP (ref 10.3–14.5)
SODIUM SERPL-SCNC: 135 MMOL/L — SIGNIFICANT CHANGE UP (ref 135–145)
WBC # BLD: 9.88 K/UL — SIGNIFICANT CHANGE UP (ref 3.8–10.5)
WBC # FLD AUTO: 9.88 K/UL — SIGNIFICANT CHANGE UP (ref 3.8–10.5)

## 2018-07-02 PROCEDURE — 99233 SBSQ HOSP IP/OBS HIGH 50: CPT

## 2018-07-02 RX ADMIN — Medication 1 TABLET(S): at 13:29

## 2018-07-02 RX ADMIN — Medication 5 MILLIGRAM(S): at 21:08

## 2018-07-02 RX ADMIN — Medication 100 MILLIGRAM(S): at 21:08

## 2018-07-02 RX ADMIN — PANTOPRAZOLE SODIUM 40 MILLIGRAM(S): 20 TABLET, DELAYED RELEASE ORAL at 05:14

## 2018-07-02 RX ADMIN — SENNA PLUS 2 TABLET(S): 8.6 TABLET ORAL at 21:08

## 2018-07-02 RX ADMIN — LIDOCAINE 1 PATCH: 4 CREAM TOPICAL at 00:01

## 2018-07-02 RX ADMIN — CEFTRIAXONE 100 GRAM(S): 500 INJECTION, POWDER, FOR SOLUTION INTRAMUSCULAR; INTRAVENOUS at 16:57

## 2018-07-02 RX ADMIN — Medication 100 MILLIGRAM(S): at 05:15

## 2018-07-02 RX ADMIN — Medication 100 MILLIGRAM(S): at 13:29

## 2018-07-02 RX ADMIN — Medication 1 TABLET(S): at 13:30

## 2018-07-02 RX ADMIN — ENOXAPARIN SODIUM 40 MILLIGRAM(S): 100 INJECTION SUBCUTANEOUS at 13:29

## 2018-07-02 NOTE — PROGRESS NOTE ADULT - PROBLEM SELECTOR PLAN 6
- continue nexium
ED team, Dr. Castaneda discussed incidental finding of meningioma with neurosurgical team (Dr. Gulshan Ferrera). Because of the meningioma's calcified appearance and lack of neurologic symptoms, no acute neurosurgical intervention required at this time.  The patient is advised to follow up with her primary care doctor for repeat imaging.
Because of the meningioma's calcified appearance and lack of neurologic symptoms, no acute neurosurgical intervention required at this time.  The patient is advised to follow up with her primary care doctor for repeat imaging.

## 2018-07-02 NOTE — PROGRESS NOTE ADULT - SUBJECTIVE AND OBJECTIVE BOX
Patient is a 89y old  Female who presents with a chief complaint of fall at home (29 Jun 2018 20:15)      INTERVAL HPI/OVERNIGHT EVENTS:       This patient is a 89yoF with PMH of bilateral hip fractures, L wrist fracture, osteopenia, GERD and mild cognitive impairment who presents to the ED after witnessed fall at home. Patient's daughter, Autumn, at bedside, provided the majority of history.      Medications:MEDICATIONS  (STANDING):  calcium carbonate 1250 mG (OsCal) 1 Tablet(s) Oral daily  cefTRIAXone   IVPB 1 Gram(s) IV Intermittent every 24 hours  docusate sodium 100 milliGRAM(s) Oral three times a day  enoxaparin Injectable 40 milliGRAM(s) SubCutaneous every 24 hours  lactobacillus acidophilus 1 Tablet(s) Oral daily  melatonin 5 milliGRAM(s) Oral at bedtime  pantoprazole    Tablet 40 milliGRAM(s) Oral before breakfast  senna 2 Tablet(s) Oral at bedtime    MEDICATIONS  (PRN):  acetaminophen   Tablet. 650 milliGRAM(s) Oral every 6 hours PRN Moderate or severe Pain (4 - 6)  lidocaine   Patch 1 Patch Transdermal daily PRN back pain      Allergies: Allergies    Bactrim (Rash)    Intolerances        REVIEW OF SYSTEMS:  CONSTITUTIONAL: No fever, weight loss, or fatigue  EYES: No eye pain, visual disturbances, or discharge  ENMT:  No difficulty hearing, tinnitus, vertigo; No sinus or throat pain  NECK: No pain or stiffness  BREASTS: No pain, masses, or nipple discharge  RESPIRATORY: No cough, wheezing, chills or hemoptysis; No shortness of breath  CARDIOVASCULAR: No chest pain, palpitations, dizziness, or leg swelling  GASTROINTESTINAL: No abdominal or epigastric pain. No nausea, vomiting, or hematemesis; No diarrhea or constipation. No melena or hematochezia.  GENITOURINARY: No dysuria, frequency, hematuria, or incontinence  NEUROLOGICAL: No headaches, memory loss, loss of strength, numbness, or tremors  SKIN: No itching, burning, rashes, or lesions   LYMPH NODES: No enlarged glands  ENDOCRINE: No heat or cold intolerance; No hair loss  MUSCULOSKELETAL: No joint pain or swelling; No muscle, back, or extremity pain  PSYCHIATRIC: No depression, anxiety, mood swings, or difficulty sleeping  HEME/LYMPH: No easy bruising, or bleeding gums  ALLERY AND IMMUNOLOGIC: No hives or eczema    T(C): 36.9 (07-02-18 @ 09:26), Max: 36.9 (07-02-18 @ 04:18)  HR: 94 (07-02-18 @ 09:26) (92 - 100)  BP: 141/84 (07-02-18 @ 09:26) (128/96 - 145/88)  RR: 18 (07-02-18 @ 09:26) (18 - 18)  SpO2: 94% (07-02-18 @ 09:26) (94% - 97%)  Wt(kg): --Vital Signs Last 24 Hrs  T(C): 36.9 (02 Jul 2018 09:26), Max: 36.9 (02 Jul 2018 04:18)  T(F): 98.4 (02 Jul 2018 09:26), Max: 98.5 (02 Jul 2018 04:18)  HR: 94 (02 Jul 2018 09:26) (92 - 100)  BP: 141/84 (02 Jul 2018 09:26) (128/96 - 145/88)  BP(mean): --  RR: 18 (02 Jul 2018 09:26) (18 - 18)  SpO2: 94% (02 Jul 2018 09:26) (94% - 97%)  I&O's Summary    01 Jul 2018 07:01  -  02 Jul 2018 07:00  --------------------------------------------------------  IN: 880 mL / OUT: 0 mL / NET: 880 mL    02 Jul 2018 07:01  -  02 Jul 2018 13:51  --------------------------------------------------------  IN: 120 mL / OUT: 0 mL / NET: 120 mL      Last Menstrual Period      PHYSICAL EXAM:  GENERAL: NAD, well-groomed, well-developed  HEAD:  Atraumatic, Normocephalic  EYES: EOMI, PERRLA, conjunctiva and sclera clear  ENMT: No tonsillar erythema, exudates, or enlargement; Moist mucous membranes, Good dentition, No lesions  NECK: Supple, No JVD, Normal thyroid  NERVOUS SYSTEM:  Alert & Oriented X3, Good concentration; Motor Strength 5/5 B/L upper and lower extremities; DTRs 2+ intact and symmetric  CHEST/LUNG: Clear to percussion bilaterally; No rales, rhonchi, wheezing, or rubs  HEART: Regular rate and rhythm; No murmurs, rubs, or gallops  ABDOMEN: Soft, Nontender, Nondistended; Bowel sounds present  EXTREMITIES:  2+ Peripheral Pulses, No clubbing, cyanosis, or edema  LYMPH: No lymphadenopathy noted  SKIN: No rashes or lesions    Consultant(s) Notes Reviewed:  [x ] YES  [ ] NO  Care Discussed with Consultants/Other Providers [ x] YES  [ ] NO  Name of Consultant  LABS:                        13.0   9.88  )-----------( 268      ( 02 Jul 2018 07:58 )             39.5     07-02    135  |  98  |  20  ----------------------------<  135<H>  3.7   |  22  |  1.00    Ca    9.5      02 Jul 2018 06:55          CAPILLARY BLOOD GLUCOSE                RADIOLOGY & ADDITIONAL TESTS:  EKG :     Imaging Personally Reviewed:  [ ] YES  [ ] NO  HEALTH ISSUES - PROBLEM Dx:  Elevated blood pressure reading: Elevated blood pressure reading  Chest pain: Chest pain  Fracture of thoracic vertebra: Fracture of thoracic vertebra  GERD (gastroesophageal reflux disease): GERD (gastroesophageal reflux disease)  Mild cognitive impairment: Mild cognitive impairment  Fracture of thoracic vertebra, compression: Fracture of thoracic vertebra, compression  Need for prophylactic measure: Need for prophylactic measure  Meningioma: Meningioma  Fall at home, initial encounter: Fall at home, initial encounter  Urinary tract infection without hematuria, site unspecified: Urinary tract infection without hematuria, site unspecified Patient is a 89y old  Female who presents with a chief complaint of fall at home (29 Jun 2018 20:15)      INTERVAL HPI/OVERNIGHT EVENTS:       This patient is a 89yoF with PMH of bilateral hip fractures, L wrist fracture, osteopenia, GERD and mild cognitive impairment who presents to the ED after witnessed fall at home.       Medications:MEDICATIONS  (STANDING):  calcium carbonate 1250 mG (OsCal) 1 Tablet(s) Oral daily  cefTRIAXone   IVPB 1 Gram(s) IV Intermittent every 24 hours  docusate sodium 100 milliGRAM(s) Oral three times a day  enoxaparin Injectable 40 milliGRAM(s) SubCutaneous every 24 hours  lactobacillus acidophilus 1 Tablet(s) Oral daily  melatonin 5 milliGRAM(s) Oral at bedtime  pantoprazole    Tablet 40 milliGRAM(s) Oral before breakfast  senna 2 Tablet(s) Oral at bedtime    MEDICATIONS  (PRN):  acetaminophen   Tablet. 650 milliGRAM(s) Oral every 6 hours PRN Moderate or severe Pain (4 - 6)  lidocaine   Patch 1 Patch Transdermal daily PRN back pain      Allergies: Allergies    Bactrim (Rash)    Intolerances        REVIEW OF SYSTEMS:  CONSTITUTIONAL: No fever, weight loss, or fatigue  EYES: No eye pain, visual disturbances, or discharge  ENMT:  No difficulty hearing, tinnitus, vertigo; No sinus or throat pain  NECK: No pain or stiffness  BREASTS: No pain, masses, or nipple discharge  RESPIRATORY: No cough, wheezing, chills or hemoptysis; No shortness of breath  CARDIOVASCULAR: No chest pain, palpitations, dizziness, or leg swelling  GASTROINTESTINAL: No abdominal or epigastric pain. No nausea, vomiting, or hematemesis; No diarrhea or constipation. No melena or hematochezia.  GENITOURINARY: No dysuria, frequency, hematuria, or incontinence  NEUROLOGICAL: No headaches, memory loss, loss of strength, numbness, or tremors  SKIN: No itching, burning, rashes, or lesions   LYMPH NODES: No enlarged glands  ENDOCRINE: No heat or cold intolerance; No hair loss  MUSCULOSKELETAL: No joint pain or swelling; No muscle, back, or extremity pain  PSYCHIATRIC: No depression, anxiety, mood swings, or difficulty sleeping  HEME/LYMPH: No easy bruising, or bleeding gums  ALLERY AND IMMUNOLOGIC: No hives or eczema    T(C): 36.9 (07-02-18 @ 09:26), Max: 36.9 (07-02-18 @ 04:18)  HR: 94 (07-02-18 @ 09:26) (92 - 100)  BP: 141/84 (07-02-18 @ 09:26) (128/96 - 145/88)  RR: 18 (07-02-18 @ 09:26) (18 - 18)  SpO2: 94% (07-02-18 @ 09:26) (94% - 97%)  Wt(kg): --Vital Signs Last 24 Hrs  T(C): 36.9 (02 Jul 2018 09:26), Max: 36.9 (02 Jul 2018 04:18)  T(F): 98.4 (02 Jul 2018 09:26), Max: 98.5 (02 Jul 2018 04:18)  HR: 94 (02 Jul 2018 09:26) (92 - 100)  BP: 141/84 (02 Jul 2018 09:26) (128/96 - 145/88)  BP(mean): --  RR: 18 (02 Jul 2018 09:26) (18 - 18)  SpO2: 94% (02 Jul 2018 09:26) (94% - 97%)  I&O's Summary    01 Jul 2018 07:01  -  02 Jul 2018 07:00  --------------------------------------------------------  IN: 880 mL / OUT: 0 mL / NET: 880 mL    02 Jul 2018 07:01  -  02 Jul 2018 13:51  --------------------------------------------------------  IN: 120 mL / OUT: 0 mL / NET: 120 mL      Last Menstrual Period      PHYSICAL EXAM:  GENERAL: NAD, well-groomed, well-developed  HEAD:  Atraumatic, Normocephalic  EYES: EOMI, PERRLA, conjunctiva and sclera clear  ENMT: No tonsillar erythema, exudates, or enlargement; Moist mucous membranes, Good dentition, No lesions  NECK: Supple, No JVD, Normal thyroid  NERVOUS SYSTEM:  Alert & Oriented X3, Good concentration; Motor Strength 5/5 B/L upper and lower extremities; DTRs 2+ intact and symmetric  CHEST/LUNG: Clear to percussion bilaterally; No rales, rhonchi, wheezing, or rubs  HEART: Regular rate and rhythm; No murmurs, rubs, or gallops  ABDOMEN: Soft, Nontender, Nondistended; Bowel sounds present  EXTREMITIES:  2+ Peripheral Pulses, No clubbing, cyanosis, or edema  LYMPH: No lymphadenopathy noted  SKIN: No rashes or lesions    Consultant(s) Notes Reviewed:  [x ] YES  [ ] NO  Care Discussed with Consultants/Other Providers [ x] YES  [ ] NO  Name of Consultant  LABS:                        13.0   9.88  )-----------( 268      ( 02 Jul 2018 07:58 )             39.5     07-02    135  |  98  |  20  ----------------------------<  135<H>  3.7   |  22  |  1.00    Ca    9.5      02 Jul 2018 06:55          CAPILLARY BLOOD GLUCOSE                RADIOLOGY & ADDITIONAL TESTS:  EKG :     Imaging Personally Reviewed:  [ ] YES  [ ] NO  HEALTH ISSUES - PROBLEM Dx:  Elevated blood pressure reading: Elevated blood pressure reading  Chest pain: Chest pain  Fracture of thoracic vertebra: Fracture of thoracic vertebra  GERD (gastroesophageal reflux disease): GERD (gastroesophageal reflux disease)  Mild cognitive impairment: Mild cognitive impairment  Fracture of thoracic vertebra, compression: Fracture of thoracic vertebra, compression  Need for prophylactic measure: Need for prophylactic measure  Meningioma: Meningioma  Fall at home, initial encounter: Fall at home, initial encounter  Urinary tract infection without hematuria, site unspecified: Urinary tract infection without hematuria, site unspecified Patient is a 89y old  Female who presents with a chief complaint of fall at home (29 Jun 2018 20:15)      INTERVAL HPI/OVERNIGHT EVENTS:       This patient is a 89yoF with PMH of bilateral hip fractures, L wrist fracture, osteopenia, GERD and mild cognitive impairment who presents to the ED after witnessed fall at home.  patient states that she had pain on her chest due to persistent pulling up on her bed.  no sob, no diarrhea       Medications:MEDICATIONS  (STANDING):  calcium carbonate 1250 mG (OsCal) 1 Tablet(s) Oral daily  cefTRIAXone   IVPB 1 Gram(s) IV Intermittent every 24 hours  docusate sodium 100 milliGRAM(s) Oral three times a day  enoxaparin Injectable 40 milliGRAM(s) SubCutaneous every 24 hours  lactobacillus acidophilus 1 Tablet(s) Oral daily  melatonin 5 milliGRAM(s) Oral at bedtime  pantoprazole    Tablet 40 milliGRAM(s) Oral before breakfast  senna 2 Tablet(s) Oral at bedtime    MEDICATIONS  (PRN):  acetaminophen   Tablet. 650 milliGRAM(s) Oral every 6 hours PRN Moderate or severe Pain (4 - 6)  lidocaine   Patch 1 Patch Transdermal daily PRN back pain      Allergies: Allergies    Bactrim (Rash)    Intolerances        REVIEW OF SYSTEMS:  CONSTITUTIONAL: No fever  NECK: No pain or stiffness  RESPIRATORY: No cough  CARDIOVASCULAR: No current chest pain/ see above   GASTROINTESTINAL: No abdominal or epigastric pain. No nausea, vomiting, or hematemesis; No diarrhea   GENITOURINARY: No dysuria  NEUROLOGICAL: No headaches    T(C): 36.9 (07-02-18 @ 09:26), Max: 36.9 (07-02-18 @ 04:18)  HR: 94 (07-02-18 @ 09:26) (92 - 100)  BP: 141/84 (07-02-18 @ 09:26) (128/96 - 145/88)  RR: 18 (07-02-18 @ 09:26) (18 - 18)  SpO2: 94% (07-02-18 @ 09:26) (94% - 97%)  Wt(kg): --Vital Signs Last 24 Hrs  T(C): 36.9 (02 Jul 2018 09:26), Max: 36.9 (02 Jul 2018 04:18)  T(F): 98.4 (02 Jul 2018 09:26), Max: 98.5 (02 Jul 2018 04:18)  HR: 94 (02 Jul 2018 09:26) (92 - 100)  BP: 141/84 (02 Jul 2018 09:26) (128/96 - 145/88)  BP(mean): --  RR: 18 (02 Jul 2018 09:26) (18 - 18)  SpO2: 94% (02 Jul 2018 09:26) (94% - 97%)  I&O's Summary    01 Jul 2018 07:01  -  02 Jul 2018 07:00  --------------------------------------------------------  IN: 880 mL / OUT: 0 mL / NET: 880 mL    02 Jul 2018 07:01  -  02 Jul 2018 13:51  --------------------------------------------------------  IN: 120 mL / OUT: 0 mL / NET: 120 mL      Last Menstrual Period      PHYSICAL EXAM:  GENERAL: NAD, well-groomed, well-developed  HEAD:  Atraumatic, Normocephalic  NECK: Supple, No JVD, Normal thyroid  NERVOUS SYSTEM:  Alert & Oriented   CHEST/LUNG: Clear to percussion bilaterally; No rales, rhonchi, wheezing, or rubs  HEART: Regular rate and rhythm  ABDOMEN: Soft, Nontender, Nondistended; Bowel sounds present  EXTREMITIES:  2+ Peripheral Pulses, No clubbing, cyanosis, or edema      Consultant(s) Notes Reviewed:  [x ] YES  [ ] NO  Care Discussed with Consultants/Other Providers [ x] YES  [ ] NO  Name of Consultant  LABS:                        13.0   9.88  )-----------( 268      ( 02 Jul 2018 07:58 )             39.5     07-02    135  |  98  |  20  ----------------------------<  135<H>  3.7   |  22  |  1.00    Ca    9.5      02 Jul 2018 06:55          CAPILLARY BLOOD GLUCOSE                RADIOLOGY & ADDITIONAL TESTS:  EKG :     Imaging Personally Reviewed:  [ ] YES  [ ] NO  HEALTH ISSUES - PROBLEM Dx:  Elevated blood pressure reading: Elevated blood pressure reading  Chest pain: Chest pain  Fracture of thoracic vertebra: Fracture of thoracic vertebra  GERD (gastroesophageal reflux disease): GERD (gastroesophageal reflux disease)  Mild cognitive impairment: Mild cognitive impairment  Fracture of thoracic vertebra, compression: Fracture of thoracic vertebra, compression  Need for prophylactic measure: Need for prophylactic measure  Meningioma: Meningioma  Fall at home, initial encounter: Fall at home, initial encounter  Urinary tract infection without hematuria, site unspecified: Urinary tract infection without hematuria, site unspecified

## 2018-07-02 NOTE — PROGRESS NOTE ADULT - PROBLEM SELECTOR PLAN 1
Patient with chest pain this AM. EKG personally reviewed, NSR at 63 bpm w/ no ST-T changes and QTc 412, no evidence of ischemia.  HsT negative X 2   - c/w monitoring. Patient with chest pain this AM. EKG personally reviewed, NSR at 63 bpm w/ no ST-T changes and QTc 412, no evidence of ischemia.  HsT negative X 2   - c/w monitoring.  pt refuses topical agent for pain / states that she will be fine

## 2018-07-03 ENCOUNTER — TRANSCRIPTION ENCOUNTER (OUTPATIENT)
Age: 83
End: 2018-07-03

## 2018-07-03 VITALS
DIASTOLIC BLOOD PRESSURE: 69 MMHG | OXYGEN SATURATION: 95 % | SYSTOLIC BLOOD PRESSURE: 135 MMHG | TEMPERATURE: 98 F | HEART RATE: 95 BPM | RESPIRATION RATE: 18 BRPM

## 2018-07-03 PROCEDURE — 85730 THROMBOPLASTIN TIME PARTIAL: CPT

## 2018-07-03 PROCEDURE — 70450 CT HEAD/BRAIN W/O DYE: CPT

## 2018-07-03 PROCEDURE — 96375 TX/PRO/DX INJ NEW DRUG ADDON: CPT

## 2018-07-03 PROCEDURE — 97162 PT EVAL MOD COMPLEX 30 MIN: CPT

## 2018-07-03 PROCEDURE — 85610 PROTHROMBIN TIME: CPT

## 2018-07-03 PROCEDURE — 93005 ELECTROCARDIOGRAM TRACING: CPT

## 2018-07-03 PROCEDURE — 85027 COMPLETE CBC AUTOMATED: CPT

## 2018-07-03 PROCEDURE — 80048 BASIC METABOLIC PNL TOTAL CA: CPT

## 2018-07-03 PROCEDURE — 81001 URINALYSIS AUTO W/SCOPE: CPT

## 2018-07-03 PROCEDURE — 87186 SC STD MICRODIL/AGAR DIL: CPT

## 2018-07-03 PROCEDURE — 71045 X-RAY EXAM CHEST 1 VIEW: CPT

## 2018-07-03 PROCEDURE — 80053 COMPREHEN METABOLIC PANEL: CPT

## 2018-07-03 PROCEDURE — 99239 HOSP IP/OBS DSCHRG MGMT >30: CPT

## 2018-07-03 PROCEDURE — 71250 CT THORAX DX C-: CPT

## 2018-07-03 PROCEDURE — 96374 THER/PROPH/DIAG INJ IV PUSH: CPT

## 2018-07-03 PROCEDURE — 84484 ASSAY OF TROPONIN QUANT: CPT

## 2018-07-03 PROCEDURE — 99285 EMERGENCY DEPT VISIT HI MDM: CPT | Mod: 25

## 2018-07-03 PROCEDURE — 72125 CT NECK SPINE W/O DYE: CPT

## 2018-07-03 PROCEDURE — 72170 X-RAY EXAM OF PELVIS: CPT

## 2018-07-03 PROCEDURE — 87086 URINE CULTURE/COLONY COUNT: CPT

## 2018-07-03 RX ORDER — LANOLIN ALCOHOL/MO/W.PET/CERES
1 CREAM (GRAM) TOPICAL
Qty: 0 | Refills: 0 | DISCHARGE
Start: 2018-07-03

## 2018-07-03 RX ORDER — CALCIUM CARBONATE 500(1250)
1 TABLET ORAL
Qty: 0 | Refills: 0 | COMMUNITY
Start: 2018-07-03

## 2018-07-03 RX ORDER — LIDOCAINE 4 G/100G
1 CREAM TOPICAL
Qty: 0 | Refills: 0 | Status: DISCONTINUED | OUTPATIENT
Start: 2018-07-03 | End: 2018-07-03

## 2018-07-03 RX ORDER — ACETAMINOPHEN 500 MG
2 TABLET ORAL
Qty: 0 | Refills: 0 | COMMUNITY
Start: 2018-07-03

## 2018-07-03 RX ORDER — MINERAL OIL
0 OIL (ML) MISCELLANEOUS
Qty: 0 | Refills: 0 | COMMUNITY

## 2018-07-03 RX ORDER — LIDOCAINE 4 G/100G
1 CREAM TOPICAL
Qty: 0 | Refills: 0 | COMMUNITY
Start: 2018-07-03

## 2018-07-03 RX ORDER — ESOMEPRAZOLE MAGNESIUM 40 MG/1
1 CAPSULE, DELAYED RELEASE ORAL
Qty: 0 | Refills: 0 | COMMUNITY

## 2018-07-03 RX ORDER — LANOLIN ALCOHOL/MO/W.PET/CERES
1 CREAM (GRAM) TOPICAL
Qty: 0 | Refills: 0 | COMMUNITY

## 2018-07-03 RX ORDER — SENNA PLUS 8.6 MG/1
2 TABLET ORAL
Qty: 0 | Refills: 0 | COMMUNITY
Start: 2018-07-03

## 2018-07-03 RX ORDER — ACETAMINOPHEN 500 MG
2 TABLET ORAL
Qty: 0 | Refills: 0 | COMMUNITY

## 2018-07-03 RX ADMIN — ENOXAPARIN SODIUM 40 MILLIGRAM(S): 100 INJECTION SUBCUTANEOUS at 13:16

## 2018-07-03 RX ADMIN — PANTOPRAZOLE SODIUM 40 MILLIGRAM(S): 20 TABLET, DELAYED RELEASE ORAL at 05:09

## 2018-07-03 RX ADMIN — Medication 100 MILLIGRAM(S): at 13:16

## 2018-07-03 RX ADMIN — Medication 1 TABLET(S): at 13:16

## 2018-07-03 RX ADMIN — Medication 650 MILLIGRAM(S): at 11:44

## 2018-07-03 RX ADMIN — Medication 1 TABLET(S): at 11:44

## 2018-07-03 RX ADMIN — Medication 100 MILLIGRAM(S): at 05:06

## 2018-07-03 RX ADMIN — Medication 650 MILLIGRAM(S): at 12:44

## 2018-07-03 RX ADMIN — LIDOCAINE 1 APPLICATION(S): 4 CREAM TOPICAL at 13:35

## 2018-07-03 NOTE — DISCHARGE NOTE ADULT - CARE PROVIDER_API CALL
Bhavin Gardner), Internal Medicine  [address] [facility] [address] [address]  Phone: [phone]  Fax: [phone]

## 2018-07-03 NOTE — PROGRESS NOTE ADULT - PROBLEM SELECTOR PROBLEM 2
Elevated blood pressure reading
Fall at home, initial encounter
Elevated blood pressure reading
Fall at home, subsequent encounter

## 2018-07-03 NOTE — DISCHARGE NOTE ADULT - MEDICATION SUMMARY - MEDICATIONS TO TAKE
I will START or STAY ON the medications listed below when I get home from the hospital:    acetaminophen 325 mg oral tablet  -- 2 tab(s) by mouth every 6 hours, As needed, Moderate or severe Pain (4 - 6)  -- Indication: For Mild pain    calcium carbonate 1250 mg (500 mg elemental calcium) oral tablet  -- 1 tab(s) by mouth once a day  -- Indication: For supplement    lidocaine 5% topical film  -- Apply on skin to affected area once a day  -- Indication: For pain    lidocaine 2% topical gel with applicator  -- 1 application on skin 2 times a day, As needed, Pain  -- Indication: For pain    senna oral tablet  -- 2 tab(s) by mouth once a day (at bedtime)  -- Indication: For Constipation    melatonin 5 mg oral tablet  -- 1 tab(s) by mouth once a day (at bedtime)  -- Indication: For sleep aid    NexIUM 5 mg oral powder for reconstitution, delayed release  -- 1 each by mouth once a day  -- Indication: For stomach protection

## 2018-07-03 NOTE — PROGRESS NOTE ADULT - SUBJECTIVE AND OBJECTIVE BOX
Patient is a 89y old  Female who presents with a chief complaint of fall at home (29 Jun 2018 20:15)      INTERVAL HPI/OVERNIGHT EVENTS:    Medications:MEDICATIONS  (STANDING):  calcium carbonate 1250 mG (OsCal) 1 Tablet(s) Oral daily  cefTRIAXone   IVPB 1 Gram(s) IV Intermittent every 24 hours  docusate sodium 100 milliGRAM(s) Oral three times a day  enoxaparin Injectable 40 milliGRAM(s) SubCutaneous every 24 hours  lactobacillus acidophilus 1 Tablet(s) Oral daily  melatonin 5 milliGRAM(s) Oral at bedtime  pantoprazole    Tablet 40 milliGRAM(s) Oral before breakfast  senna 2 Tablet(s) Oral at bedtime    MEDICATIONS  (PRN):  acetaminophen   Tablet. 650 milliGRAM(s) Oral every 6 hours PRN Moderate or severe Pain (4 - 6)  lidocaine   Patch 1 Patch Transdermal daily PRN back pain      Allergies: Allergies    Bactrim (Rash)    Intolerances        REVIEW OF SYSTEMS:  CONSTITUTIONAL: No fever, weight loss, or fatigue  EYES: No eye pain, visual disturbances, or discharge  ENMT:  No difficulty hearing, tinnitus, vertigo; No sinus or throat pain  NECK: No pain or stiffness  BREASTS: No pain, masses, or nipple discharge  RESPIRATORY: No cough, wheezing, chills or hemoptysis; No shortness of breath  CARDIOVASCULAR: No chest pain, palpitations, dizziness, or leg swelling  GASTROINTESTINAL: No abdominal or epigastric pain. No nausea, vomiting, or hematemesis; No diarrhea or constipation. No melena or hematochezia.  GENITOURINARY: No dysuria, frequency, hematuria, or incontinence  NEUROLOGICAL: No headaches, memory loss, loss of strength, numbness, or tremors  SKIN: No itching, burning, rashes, or lesions   LYMPH NODES: No enlarged glands  ENDOCRINE: No heat or cold intolerance; No hair loss  MUSCULOSKELETAL: No joint pain or swelling; No muscle, back, or extremity pain  PSYCHIATRIC: No depression, anxiety, mood swings, or difficulty sleeping  HEME/LYMPH: No easy bruising, or bleeding gums  ALLERY AND IMMUNOLOGIC: No hives or eczema    T(C): 36.9 (07-03-18 @ 08:58), Max: 36.9 (07-03-18 @ 08:58)  HR: 95 (07-03-18 @ 08:58) (87 - 98)  BP: 135/69 (07-03-18 @ 08:58) (135/69 - 160/88)  RR: 18 (07-03-18 @ 08:58) (18 - 18)  SpO2: 95% (07-03-18 @ 08:58) (95% - 96%)  Wt(kg): --Vital Signs Last 24 Hrs  T(C): 36.9 (03 Jul 2018 08:58), Max: 36.9 (03 Jul 2018 08:58)  T(F): 98.4 (03 Jul 2018 08:58), Max: 98.4 (03 Jul 2018 08:58)  HR: 95 (03 Jul 2018 08:58) (87 - 98)  BP: 135/69 (03 Jul 2018 08:58) (135/69 - 160/88)  BP(mean): --  RR: 18 (03 Jul 2018 08:58) (18 - 18)  SpO2: 95% (03 Jul 2018 08:58) (95% - 96%)  I&O's Summary    02 Jul 2018 07:01  -  03 Jul 2018 07:00  --------------------------------------------------------  IN: 120 mL / OUT: 0 mL / NET: 120 mL    03 Jul 2018 07:01  -  03 Jul 2018 11:16  --------------------------------------------------------  IN: 120 mL / OUT: 0 mL / NET: 120 mL      Last Menstrual Period      PHYSICAL EXAM:  GENERAL: NAD, well-groomed, well-developed  HEAD:  Atraumatic, Normocephalic  EYES: EOMI, PERRLA, conjunctiva and sclera clear  ENMT: No tonsillar erythema, exudates, or enlargement; Moist mucous membranes, Good dentition, No lesions  NECK: Supple, No JVD, Normal thyroid  NERVOUS SYSTEM:  Alert & Oriented X3, Good concentration; Motor Strength 5/5 B/L upper and lower extremities; DTRs 2+ intact and symmetric  CHEST/LUNG: Clear to percussion bilaterally; No rales, rhonchi, wheezing, or rubs  HEART: Regular rate and rhythm; No murmurs, rubs, or gallops  ABDOMEN: Soft, Nontender, Nondistended; Bowel sounds present  EXTREMITIES:  2+ Peripheral Pulses, No clubbing, cyanosis, or edema  LYMPH: No lymphadenopathy noted  SKIN: No rashes or lesions    Consultant(s) Notes Reviewed:  [x ] YES  [ ] NO  Care Discussed with Consultants/Other Providers [ x] YES  [ ] NO  Name of Consultant  LABS:                        13.0   9.88  )-----------( 268      ( 02 Jul 2018 07:58 )             39.5     07-02    135  |  98  |  20  ----------------------------<  135<H>  3.7   |  22  |  1.00    Ca    9.5      02 Jul 2018 06:55          CAPILLARY BLOOD GLUCOSE                RADIOLOGY & ADDITIONAL TESTS:  EKG :     Imaging Personally Reviewed:  [ ] YES  [ ] NO  HEALTH ISSUES - PROBLEM Dx:  Fall at home, subsequent encounter: Fall at home, subsequent encounter  Elevated blood pressure reading: Elevated blood pressure reading  Chest pain: Chest pain  Fracture of thoracic vertebra: Fracture of thoracic vertebra  GERD (gastroesophageal reflux disease): GERD (gastroesophageal reflux disease)  Mild cognitive impairment: Mild cognitive impairment  Fracture of thoracic vertebra, compression: Fracture of thoracic vertebra, compression  Need for prophylactic measure: Need for prophylactic measure  Meningioma: Meningioma  Fall at home, initial encounter: Fall at home, initial encounter  Urinary tract infection without hematuria, site unspecified: Urinary tract infection without hematuria, site unspecified Patient is a 89y old  Female who presents with a chief complaint of fall at home (29 Jun 2018 20:15)      INTERVAL HPI/OVERNIGHT EVENTS:    Patient denies any pain today .  SHe states that her chest pain was from pulling.  As per daughter, patient was confused last night did not know where she was .  Today, when patient was seen in AM, she was fully oriented and to time ( knows the year and not the day, knows the name of the hospital and self and denies any hallucinations.        Medications:MEDICATIONS  (STANDING):  calcium carbonate 1250 mG (OsCal) 1 Tablet(s) Oral daily  cefTRIAXone   IVPB 1 Gram(s) IV Intermittent every 24 hours  docusate sodium 100 milliGRAM(s) Oral three times a day  enoxaparin Injectable 40 milliGRAM(s) SubCutaneous every 24 hours  lactobacillus acidophilus 1 Tablet(s) Oral daily  melatonin 5 milliGRAM(s) Oral at bedtime  pantoprazole    Tablet 40 milliGRAM(s) Oral before breakfast  senna 2 Tablet(s) Oral at bedtime    MEDICATIONS  (PRN):  acetaminophen   Tablet. 650 milliGRAM(s) Oral every 6 hours PRN Moderate or severe Pain (4 - 6)  lidocaine   Patch 1 Patch Transdermal daily PRN back pain      Allergies: Allergies    Bactrim (Rash)    Intolerances        REVIEW OF SYSTEMS:  CONSTITUTIONAL: No fever  NECK: No pain or stiffness  RESPIRATORY: No cough, wheezing, chills or hemoptysis; No shortness of breath  CARDIOVASCULAR: No chest pain, palpitations, dizziness, or leg swelling  GASTROINTESTINAL: No abdominal or epigastric pain. No nausea, vomiting, or hematemesis; No diarrhea or constipation  NEUROLOGICAL: No headaches      T(C): 36.9 (07-03-18 @ 08:58), Max: 36.9 (07-03-18 @ 08:58)  HR: 95 (07-03-18 @ 08:58) (87 - 98)  BP: 135/69 (07-03-18 @ 08:58) (135/69 - 160/88)  RR: 18 (07-03-18 @ 08:58) (18 - 18)  SpO2: 95% (07-03-18 @ 08:58) (95% - 96%)  Wt(kg): --Vital Signs Last 24 Hrs  T(C): 36.9 (03 Jul 2018 08:58), Max: 36.9 (03 Jul 2018 08:58)  T(F): 98.4 (03 Jul 2018 08:58), Max: 98.4 (03 Jul 2018 08:58)  HR: 95 (03 Jul 2018 08:58) (87 - 98)  BP: 135/69 (03 Jul 2018 08:58) (135/69 - 160/88)  BP(mean): --  RR: 18 (03 Jul 2018 08:58) (18 - 18)  SpO2: 95% (03 Jul 2018 08:58) (95% - 96%)  I&O's Summary    02 Jul 2018 07:01  -  03 Jul 2018 07:00  --------------------------------------------------------  IN: 120 mL / OUT: 0 mL / NET: 120 mL    03 Jul 2018 07:01  -  03 Jul 2018 11:16  --------------------------------------------------------  IN: 120 mL / OUT: 0 mL / NET: 120 mL      Last Menstrual Period      PHYSICAL EXAM:  GENERAL: NAD, well-groomed, well-developed  HEAD:  Atraumatic, Normocephalic  NECK: Supple, No JVD, Normal thyroid  NERVOUS SYSTEM:  Alert & Oriented X3( today)   CHEST/LUNG: Clear to percussion bilaterally; No rales, rhonchi, wheezing, or rubs  HEART: Regular rate and rhythm  ABDOMEN: Soft, Nontender, Nondistended; Bowel sounds present  EXTREMITIES:  2+ Peripheral Pulses, No clubbing, cyanosis, or edema      Consultant(s) Notes Reviewed:  [x ] YES  [ ] NO  Care Discussed with Consultants/Other Providers [ x] YES  [ ] NO  Name of Consultant  LABS:                        13.0   9.88  )-----------( 268      ( 02 Jul 2018 07:58 )             39.5     07-02    135  |  98  |  20  ----------------------------<  135<H>  3.7   |  22  |  1.00    Ca    9.5      02 Jul 2018 06:55          CAPILLARY BLOOD GLUCOSE                RADIOLOGY & ADDITIONAL TESTS:  EKG :     Imaging Personally Reviewed:  [ ] YES  [ ] NO  HEALTH ISSUES - PROBLEM Dx:  Fall at home, subsequent encounter: Fall at home, subsequent encounter  Elevated blood pressure reading: Elevated blood pressure reading  Chest pain: Chest pain  Fracture of thoracic vertebra: Fracture of thoracic vertebra  GERD (gastroesophageal reflux disease): GERD (gastroesophageal reflux disease)  Mild cognitive impairment: Mild cognitive impairment  Fracture of thoracic vertebra, compression: Fracture of thoracic vertebra, compression  Need for prophylactic measure: Need for prophylactic measure  Meningioma: Meningioma  Fall at home, initial encounter: Fall at home, initial encounter  Urinary tract infection without hematuria, site unspecified: Urinary tract infection without hematuria, site unspecified

## 2018-07-03 NOTE — DISCHARGE NOTE ADULT - CARE PLAN
Principal Discharge DX:	Chest pain Principal Discharge DX:	Chest pain  Goal:	Free from chest pain  Assessment and plan of treatment:	condition improved  continue with patch  Secondary Diagnosis:	Urinary tract infection without hematuria, site unspecified  Assessment and plan of treatment:	completed course of antibiotic treatment  Secondary Diagnosis:	Mild cognitive impairment  Assessment and plan of treatment:	continue with melatonin at night  Secondary Diagnosis:	Fall at home, subsequent encounter  Assessment and plan of treatment:	Rehab for increase mobility and strengthening

## 2018-07-03 NOTE — PROGRESS NOTE ADULT - PROBLEM SELECTOR PROBLEM 5
Urinary tract infection without hematuria, site unspecified
Fracture of thoracic vertebra, compression
Mild cognitive impairment
Meningioma

## 2018-07-03 NOTE — DISCHARGE NOTE ADULT - PLAN OF CARE
Free from chest pain condition improved  continue with patch completed course of antibiotic treatment continue with melatonin at night Rehab for increase mobility and strengthening

## 2018-07-03 NOTE — PROGRESS NOTE ADULT - PROBLEM SELECTOR PLAN 5
Urinary tract infection without hematuria, site unspecified.  Plan: UCX notable for [id number] CFU E. coli. Sensitivities are pending.  - c/w ceftriaxone 1 g IV daily for now (Day 3)  - will complete 3 days of antibiotics for uncomplicated UTI
- CT head, neck did not reveal ICH or fracture. CT chest identified a compression fracture at T9, age indeterminate  - Can consider MRI (need to d/w family), although patient currently with no pain and no neurological symptoms   - c/w calcium and vitamin D  - c/w fall precautions, bed alarm to reduce risk of falls  - patient should have DEXA scan as outpatient to determine level of bone mineralization, she may be a candidate for bisphosphonate therapy
- frequent reorientation, avoid tethers  - continue melatonin 5mg PO qhs for sleep aid
Because of the meningioma's calcified appearance and lack of neurologic symptoms, no acute neurosurgical intervention required at this time.  The patient is advised to follow up with her primary care doctor for repeat imaging.

## 2018-07-03 NOTE — PROGRESS NOTE ADULT - PROBLEM SELECTOR PLAN 3
- CT head, neck did not reveal ICH or fracture. CT chest identified a compression fracture at T9, age indeterminate  - will start lidocaine patches for pain, WBAT  - Consider MRI (need to d/w family)  - c/w calcium and vitamin D  - start fall precautions, bed alarm to reduce risk of falls  - patient should have DEXA scan as outpatient to determine level of bone mineralization, she may be a candidate for bisphosphonate therapy
UCX notable for [id number] CFU E. coli. Sensitivities are pending.  - c/w ceftriaxone 1 g IV daily for now (Day 2)  - Deescalate pending sensitivities
cont to monitor   Melatonin at night.  Patient's daughter states that patient was confused last night and she wanted Psychiatry evaluation / I have spoken with Psych team over the phone who agrees that patient most likely is sundowning and to have psychiatry evaluation and f/up as outpatient and no need for inpatient psych consult  cont to monitor
cont to monitor   Melatonin at night

## 2018-07-03 NOTE — PROGRESS NOTE ADULT - PROBLEM SELECTOR PLAN 2
PT consult with rec to [facility]   - start fall precautions, bed alarm to reduce risk of falls  - D/C opiates.

## 2018-07-03 NOTE — PROGRESS NOTE ADULT - PROBLEM SELECTOR PROBLEM 1
Chest pain
Urinary tract infection without hematuria, site unspecified
Chest pain
Urinary tract infection without hematuria, site unspecified

## 2018-07-03 NOTE — PROGRESS NOTE ADULT - PROBLEM SELECTOR PROBLEM 4
Fall at home, initial encounter
Fall at home, subsequent encounter
Meningioma
Elevated blood pressure reading

## 2018-07-03 NOTE — PROGRESS NOTE ADULT - PROBLEM SELECTOR PROBLEM 3
Fracture of thoracic vertebra, compression
Urinary tract infection without hematuria, site unspecified
Mild cognitive impairment
Mild cognitive impairment

## 2018-07-03 NOTE — PROGRESS NOTE ADULT - PROBLEM SELECTOR PLAN 1
: Urinary tract infection without hematuria, site unspecified.  Plan: UCX notable for [id number] CFU E. coli. Sensitive to Ceftriaxone   - c/w ceftriaxone 1 g IV daily for now (Day 3)  - will complete 3-4 days of antibiotics for uncomplicated UTI.   Patient will be discharged to Rehab today : Urinary tract infection without hematuria, site unspecified.  Plan: UCX notable for [id number] CFU E. coli. Sensitive to Ceftriaxone   - c/w ceftriaxone 1 g IV daily for now (Day 3)  - will complete 3-4 days of antibiotics for uncomplicated UTI.   Patient will be discharged to Rehab today  dc time 50 mns
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
## 2018-07-03 NOTE — DISCHARGE NOTE ADULT - PATIENT PORTAL LINK FT
You can access the [facility] Patient Portal, offered by [facility], by registering with the following website: [email or web address]

## 2018-07-03 NOTE — DISCHARGE NOTE ADULT - HOSPITAL COURSE
89 y/o woman with PMH of bilateral hip fractures, L wrist fracture, osteopenia, GERD and mild cognitive impairment who presents to the ED after witnessed fall at home, found to have UTI, T9 compression fracture of unclear chronicity and incidental finding of meningioma.     Chest pain. EKG reviewed, NSR at 63 bpm w/ no ST-T changes and QTc 412, no evidence of ischemia.  HsT negative X 2   condition  c/w Lidocaine patch for pain  completed abx treatment ecoli uti 89 y/o woman with PMH of bilateral hip fractures, L wrist fracture, osteopenia, GERD and mild cognitive impairment who presents to the ED after witnessed fall at home, found to have UTI, T9 compression fracture of unclear chronicity and incidental finding of meningioma.   Patient was found to have UTI with E coli  and was treated with Ceftriaxone.  Patient was having episode of Sundowning and will need to follow up as outpatient with Psychiatry.    During the hospital, patient had Chest pain. EKG reviewed, NSR at 63 bpm w/ no ST-T changes and QTc 412, no evidence of ischemia.  HsT negative X 2.  Patient had improved and was prescribed Lidocaine topical gel since patient states that the pain was due to pulling.     Because of the meningioma's calcified appearance and lack of neurologic symptoms, no acute neurosurgical intervention required at this time.  The patient is  to follow up with her primary care doctor for repeat imaging. Patient is to f/up with PCP/ Psych as outpatient . Patient 's condition was discussed with her daughter over the phone.

## 2018-07-03 NOTE — PROGRESS NOTE ADULT - PROBLEM SELECTOR PLAN 4
- ED team, Dr. Castaneda discussed incidental finding of meningioma with neurosurgical team (Dr. Gulshan Ferrera). Because of the meningioma's calcified appearance and lack of neurologic symptoms, no acute neurosurgical intervention required at this time.  The patient is advised to follow up with her primary care doctor for repeat imaging.
PT consult, evaluating today  - start fall precautions, bed alarm to reduce risk of falls  - D/C opiates
cont to monitor BP  No antihypertensive meds so far.
PT consult with rec to [facility]   - start fall precautions, bed alarm to reduce risk of falls  - D/C opiates.

## 2018-07-03 NOTE — DISCHARGE NOTE ADULT - SECONDARY DIAGNOSIS.
Urinary tract infection without hematuria, site unspecified Mild cognitive impairment Fall at home, subsequent encounter

## 2018-10-08 ENCOUNTER — INPATIENT (INPATIENT)
Facility: HOSPITAL | Age: 83
LOS: 1 days | Discharge: HOME | DRG: 690 | End: 2018-10-10
Attending: HOSPITALIST | Admitting: HOSPITALIST
Payer: MEDICARE

## 2018-10-08 VITALS
TEMPERATURE: 98 F | SYSTOLIC BLOOD PRESSURE: 150 MMHG | DIASTOLIC BLOOD PRESSURE: 91 MMHG | OXYGEN SATURATION: 97 % | HEART RATE: 95 BPM | RESPIRATION RATE: 18 BRPM

## 2018-10-08 DIAGNOSIS — M81.0 AGE-RELATED OSTEOPOROSIS WITHOUT CURRENT PATHOLOGICAL FRACTURE: ICD-10-CM

## 2018-10-08 DIAGNOSIS — N12 TUBULO-INTERSTITIAL NEPHRITIS, NOT SPECIFIED AS ACUTE OR CHRONIC: ICD-10-CM

## 2018-10-08 DIAGNOSIS — Z29.9 ENCOUNTER FOR PROPHYLACTIC MEASURES, UNSPECIFIED: ICD-10-CM

## 2018-10-08 DIAGNOSIS — E87.1 HYPO-OSMOLALITY AND HYPONATREMIA: ICD-10-CM

## 2018-10-08 DIAGNOSIS — Z96.60 PRESENCE OF UNSPECIFIED ORTHOPEDIC JOINT IMPLANT: Chronic | ICD-10-CM

## 2018-10-08 DIAGNOSIS — Z98.89 OTHER SPECIFIED POSTPROCEDURAL STATES: Chronic | ICD-10-CM

## 2018-10-08 DIAGNOSIS — G30.9 ALZHEIMER'S DISEASE, UNSPECIFIED: ICD-10-CM

## 2018-10-08 DIAGNOSIS — K21.9 GASTRO-ESOPHAGEAL REFLUX DISEASE WITHOUT ESOPHAGITIS: ICD-10-CM

## 2018-10-08 DIAGNOSIS — Z90.710 ACQUIRED ABSENCE OF BOTH CERVIX AND UTERUS: Chronic | ICD-10-CM

## 2018-10-08 PROBLEM — N80.9 ENDOMETRIOSIS, UNSPECIFIED: Chronic | Status: ACTIVE | Noted: 2018-06-29

## 2018-10-08 LAB
ALBUMIN SERPL ELPH-MCNC: 3.2 G/DL — LOW (ref 3.3–5)
ALP SERPL-CCNC: 103 U/L — SIGNIFICANT CHANGE UP (ref 40–120)
ALT FLD-CCNC: 21 U/L — SIGNIFICANT CHANGE UP (ref 10–45)
ANION GAP SERPL CALC-SCNC: 6 MMOL/L — SIGNIFICANT CHANGE UP (ref 5–17)
ANION GAP SERPL CALC-SCNC: 8 MMOL/L — SIGNIFICANT CHANGE UP (ref 5–17)
APPEARANCE UR: ABNORMAL
APTT BLD: 22.1 SEC — LOW (ref 27.5–37.4)
AST SERPL-CCNC: 50 U/L — HIGH (ref 10–40)
BACTERIA # UR AUTO: ABNORMAL
BASOPHILS # BLD AUTO: 0.1 K/UL — SIGNIFICANT CHANGE UP (ref 0–0.2)
BASOPHILS NFR BLD AUTO: 0.5 % — SIGNIFICANT CHANGE UP (ref 0–2)
BILIRUB SERPL-MCNC: 0.7 MG/DL — SIGNIFICANT CHANGE UP (ref 0.2–1.2)
BILIRUB UR-MCNC: NEGATIVE — SIGNIFICANT CHANGE UP
BUN SERPL-MCNC: 13 MG/DL — SIGNIFICANT CHANGE UP (ref 7–23)
BUN SERPL-MCNC: 14 MG/DL — SIGNIFICANT CHANGE UP (ref 7–23)
CALCIUM SERPL-MCNC: 8.8 MG/DL — SIGNIFICANT CHANGE UP (ref 8.4–10.5)
CALCIUM SERPL-MCNC: 9.2 MG/DL — SIGNIFICANT CHANGE UP (ref 8.4–10.5)
CHLORIDE SERPL-SCNC: 96 MMOL/L — SIGNIFICANT CHANGE UP (ref 96–108)
CHLORIDE SERPL-SCNC: 99 MMOL/L — SIGNIFICANT CHANGE UP (ref 96–108)
CO2 SERPL-SCNC: 22 MMOL/L — SIGNIFICANT CHANGE UP (ref 22–31)
CO2 SERPL-SCNC: 25 MMOL/L — SIGNIFICANT CHANGE UP (ref 22–31)
COLOR SPEC: YELLOW — SIGNIFICANT CHANGE UP
CREAT SERPL-MCNC: 0.68 MG/DL — SIGNIFICANT CHANGE UP (ref 0.5–1.3)
CREAT SERPL-MCNC: 0.85 MG/DL — SIGNIFICANT CHANGE UP (ref 0.5–1.3)
DIFF PNL FLD: NEGATIVE — SIGNIFICANT CHANGE UP
EOSINOPHIL # BLD AUTO: 0.2 K/UL — SIGNIFICANT CHANGE UP (ref 0–0.5)
EOSINOPHIL NFR BLD AUTO: 1.5 % — SIGNIFICANT CHANGE UP (ref 0–6)
EPI CELLS # UR: 1 /HPF — SIGNIFICANT CHANGE UP
GLUCOSE SERPL-MCNC: 120 MG/DL — HIGH (ref 70–99)
GLUCOSE SERPL-MCNC: 130 MG/DL — HIGH (ref 70–99)
GLUCOSE UR QL: NEGATIVE — SIGNIFICANT CHANGE UP
HCT VFR BLD CALC: 43.8 % — SIGNIFICANT CHANGE UP (ref 34.5–45)
HGB BLD-MCNC: 13.4 G/DL — SIGNIFICANT CHANGE UP (ref 11.5–15.5)
HYALINE CASTS # UR AUTO: 0 /LPF — SIGNIFICANT CHANGE UP (ref 0–2)
INR BLD: 1.04 RATIO — SIGNIFICANT CHANGE UP (ref 0.88–1.16)
KETONES UR-MCNC: NEGATIVE — SIGNIFICANT CHANGE UP
LEUKOCYTE ESTERASE UR-ACNC: ABNORMAL
LYMPHOCYTES # BLD AUTO: 1.8 K/UL — SIGNIFICANT CHANGE UP (ref 1–3.3)
LYMPHOCYTES # BLD AUTO: 17.6 % — SIGNIFICANT CHANGE UP (ref 13–44)
MCHC RBC-ENTMCNC: 29.7 PG — SIGNIFICANT CHANGE UP (ref 27–34)
MCHC RBC-ENTMCNC: 30.6 GM/DL — LOW (ref 32–36)
MCV RBC AUTO: 97.1 FL — SIGNIFICANT CHANGE UP (ref 80–100)
MONOCYTES # BLD AUTO: 0.7 K/UL — SIGNIFICANT CHANGE UP (ref 0–0.9)
MONOCYTES NFR BLD AUTO: 7 % — SIGNIFICANT CHANGE UP (ref 2–14)
NEUTROPHILS # BLD AUTO: 7.7 K/UL — HIGH (ref 1.8–7.4)
NEUTROPHILS NFR BLD AUTO: 73.4 % — SIGNIFICANT CHANGE UP (ref 43–77)
NITRITE UR-MCNC: NEGATIVE — SIGNIFICANT CHANGE UP
PH UR: 6 — SIGNIFICANT CHANGE UP (ref 5–8)
PLATELET # BLD AUTO: 188 K/UL — SIGNIFICANT CHANGE UP (ref 150–400)
POTASSIUM SERPL-MCNC: 3.9 MMOL/L — SIGNIFICANT CHANGE UP (ref 3.5–5.3)
POTASSIUM SERPL-MCNC: 8.8 MMOL/L — CRITICAL HIGH (ref 3.5–5.3)
POTASSIUM SERPL-SCNC: 3.9 MMOL/L — SIGNIFICANT CHANGE UP (ref 3.5–5.3)
POTASSIUM SERPL-SCNC: 8.8 MMOL/L — CRITICAL HIGH (ref 3.5–5.3)
PROT SERPL-MCNC: 7.8 G/DL — SIGNIFICANT CHANGE UP (ref 6–8.3)
PROT UR-MCNC: NEGATIVE — SIGNIFICANT CHANGE UP
PROTHROM AB SERPL-ACNC: 11.2 SEC — SIGNIFICANT CHANGE UP (ref 9.8–12.7)
RBC # BLD: 4.51 M/UL — SIGNIFICANT CHANGE UP (ref 3.8–5.2)
RBC # FLD: 14.3 % — SIGNIFICANT CHANGE UP (ref 10.3–14.5)
RBC CASTS # UR COMP ASSIST: 8 /HPF — HIGH (ref 0–4)
SODIUM SERPL-SCNC: 124 MMOL/L — LOW (ref 135–145)
SODIUM SERPL-SCNC: 132 MMOL/L — LOW (ref 135–145)
SP GR SPEC: 1.01 — SIGNIFICANT CHANGE UP (ref 1.01–1.02)
TROPONIN T, HIGH SENSITIVITY RESULT: 9 NG/L — SIGNIFICANT CHANGE UP (ref 0–51)
UROBILINOGEN FLD QL: NEGATIVE — SIGNIFICANT CHANGE UP
WBC # BLD: 10.5 K/UL — SIGNIFICANT CHANGE UP (ref 3.8–10.5)
WBC # FLD AUTO: 10.5 K/UL — SIGNIFICANT CHANGE UP (ref 3.8–10.5)
WBC UR QL: 40 /HPF — HIGH (ref 0–5)

## 2018-10-08 PROCEDURE — 99053 MED SERV 10PM-8AM 24 HR FAC: CPT

## 2018-10-08 PROCEDURE — 93010 ELECTROCARDIOGRAM REPORT: CPT

## 2018-10-08 PROCEDURE — 71045 X-RAY EXAM CHEST 1 VIEW: CPT | Mod: 26

## 2018-10-08 PROCEDURE — 99285 EMERGENCY DEPT VISIT HI MDM: CPT | Mod: 25

## 2018-10-08 PROCEDURE — 99223 1ST HOSP IP/OBS HIGH 75: CPT

## 2018-10-08 PROCEDURE — 74176 CT ABD & PELVIS W/O CONTRAST: CPT | Mod: 26

## 2018-10-08 RX ORDER — HALOPERIDOL DECANOATE 100 MG/ML
1 INJECTION INTRAMUSCULAR ONCE
Qty: 0 | Refills: 0 | Status: COMPLETED | OUTPATIENT
Start: 2018-10-08 | End: 2018-10-08

## 2018-10-08 RX ORDER — LANOLIN ALCOHOL/MO/W.PET/CERES
5 CREAM (GRAM) TOPICAL AT BEDTIME
Qty: 0 | Refills: 0 | Status: DISCONTINUED | OUTPATIENT
Start: 2018-10-08 | End: 2018-10-10

## 2018-10-08 RX ORDER — CEFTRIAXONE 500 MG/1
1 INJECTION, POWDER, FOR SOLUTION INTRAMUSCULAR; INTRAVENOUS ONCE
Qty: 0 | Refills: 0 | Status: COMPLETED | OUTPATIENT
Start: 2018-10-08 | End: 2018-10-08

## 2018-10-08 RX ORDER — ACETAMINOPHEN 500 MG
650 TABLET ORAL EVERY 6 HOURS
Qty: 0 | Refills: 0 | Status: DISCONTINUED | OUTPATIENT
Start: 2018-10-08 | End: 2018-10-10

## 2018-10-08 RX ORDER — SODIUM CHLORIDE 9 MG/ML
500 INJECTION INTRAMUSCULAR; INTRAVENOUS; SUBCUTANEOUS ONCE
Qty: 0 | Refills: 0 | Status: COMPLETED | OUTPATIENT
Start: 2018-10-08 | End: 2018-10-08

## 2018-10-08 RX ORDER — ESOMEPRAZOLE MAGNESIUM 40 MG/1
1 CAPSULE, DELAYED RELEASE ORAL
Qty: 0 | Refills: 0 | COMMUNITY

## 2018-10-08 RX ORDER — INFLUENZA VIRUS VACCINE 15; 15; 15; 15 UG/.5ML; UG/.5ML; UG/.5ML; UG/.5ML
0.5 SUSPENSION INTRAMUSCULAR ONCE
Qty: 0 | Refills: 0 | Status: DISCONTINUED | OUTPATIENT
Start: 2018-10-08 | End: 2018-10-10

## 2018-10-08 RX ORDER — CEFTRIAXONE 500 MG/1
1 INJECTION, POWDER, FOR SOLUTION INTRAMUSCULAR; INTRAVENOUS EVERY 24 HOURS
Qty: 0 | Refills: 0 | Status: DISCONTINUED | OUTPATIENT
Start: 2018-10-09 | End: 2018-10-09

## 2018-10-08 RX ORDER — ACETAMINOPHEN 500 MG
1000 TABLET ORAL ONCE
Qty: 0 | Refills: 0 | Status: COMPLETED | OUTPATIENT
Start: 2018-10-08 | End: 2018-10-08

## 2018-10-08 RX ORDER — PANTOPRAZOLE SODIUM 20 MG/1
40 TABLET, DELAYED RELEASE ORAL
Qty: 0 | Refills: 0 | Status: DISCONTINUED | OUTPATIENT
Start: 2018-10-08 | End: 2018-10-10

## 2018-10-08 RX ORDER — HALOPERIDOL DECANOATE 100 MG/ML
1 INJECTION INTRAMUSCULAR ONCE
Qty: 0 | Refills: 0 | Status: DISCONTINUED | OUTPATIENT
Start: 2018-10-08 | End: 2018-10-08

## 2018-10-08 RX ORDER — HEPARIN SODIUM 5000 [USP'U]/ML
5000 INJECTION INTRAVENOUS; SUBCUTANEOUS EVERY 12 HOURS
Qty: 0 | Refills: 0 | Status: DISCONTINUED | OUTPATIENT
Start: 2018-10-08 | End: 2018-10-10

## 2018-10-08 RX ADMIN — SODIUM CHLORIDE 50 MILLILITER(S): 9 INJECTION INTRAMUSCULAR; INTRAVENOUS; SUBCUTANEOUS at 16:17

## 2018-10-08 RX ADMIN — Medication 400 MILLIGRAM(S): at 03:00

## 2018-10-08 RX ADMIN — Medication 1000 MILLIGRAM(S): at 05:40

## 2018-10-08 RX ADMIN — Medication 650 MILLIGRAM(S): at 16:14

## 2018-10-08 RX ADMIN — Medication 650 MILLIGRAM(S): at 16:44

## 2018-10-08 RX ADMIN — HALOPERIDOL DECANOATE 1 MILLIGRAM(S): 100 INJECTION INTRAMUSCULAR at 18:28

## 2018-10-08 RX ADMIN — CEFTRIAXONE 100 GRAM(S): 500 INJECTION, POWDER, FOR SOLUTION INTRAMUSCULAR; INTRAVENOUS at 05:39

## 2018-10-08 NOTE — CHART NOTE - NSCHARTNOTEFT_GEN_A_CORE
Nurse Called to report patient screaming and attempting to get out of bed unassisted, states she tried multiple times to reorient without success. Patient seen and examined at bedside, pt reporting that she bought this house and she is waiting on her daughter and  to pick her up.     Vital Signs Last 24 Hrs  T(C): 37.1 (08 Oct 2018 14:50), Max: 37.1 (08 Oct 2018 11:30)  T(F): 98.7 (08 Oct 2018 14:50), Max: 98.8 (08 Oct 2018 11:30)  HR: 86 (08 Oct 2018 14:50) (77 - 95)  BP: 140/78 (08 Oct 2018 14:50) (140/78 - 159/91)  BP(mean): 105 (08 Oct 2018 11:55) (105 - 105)  RR: 18 (08 Oct 2018 14:50) (15 - 18)  SpO2: 96% (08 Oct 2018 14:50) (96% - 98%)                        13.4   10.5  )-----------( 188      ( 08 Oct 2018 03:09 )             43.8     10-08    132<L>  |  99  |  13  ----------------------------<  130<H>  3.9   |  25  |  0.85    Ca    9.2      08 Oct 2018 05:14    TPro  7.8  /  Alb  3.2<L>  /  TBili  0.7  /  DBili  x   /  AST  50<H>  /  ALT  21  /  AlkPhos  103  10-08    PT/INR - ( 08 Oct 2018 03:09 )   PT: 11.2 sec;   INR: 1.04 ratio         PTT - ( 08 Oct 2018 03:09 )  PTT:22.1 sec        General: WN/WD NAD  Neurology: A&Ox1, nonfocal, SÁNCHEZ x 4  Head:  Normocephalic, atraumatic  Respiratory: CTA B/L  CV: RRR, S1S2, no murmur  Abdominal: Soft, NT, ND no palpable mass  MSK: No edema, + peripheral pulses, FROM all 4 extremity    A/P  HPI:  89yo F pmh mild dementia, osteoporosis with multiple prior falls, GERD p/w 1 day of intermittent right flank pain. patient admitted with UTI on ceftriaxone. Now with behavorial disturbance  >  Stat EKG- Qt/QTc 262/391  > psych consult in am  > Haldol 1mg IM x 1  > consider 1:1 for safety  > Endorsed to incoming tour  Luis Eduardo Jaime- CHERYL-C

## 2018-10-08 NOTE — ED ADULT NURSE NOTE - NSIMPLEMENTINTERV_GEN_ALL_ED
Implemented All Fall with Harm Risk Interventions:  [place] to call system. Call bell, personal items and telephone within reach. Instruct patient to call for assistance. Room bathroom lighting operational. Non-slip footwear when patient is off stretcher. Physically safe environment: no spills, clutter or unnecessary equipment. Stretcher in lowest position, wheels locked, appropriate side rails in place. Provide visual cue, wrist band, yellow gown, etc. Monitor gait and stability. Monitor for mental status changes and reorient to person, place, and time. Review medications for side effects contributing to fall risk. Reinforce activity limits and safety measures with patient and family. Provide visual clues: red socks.

## 2018-10-08 NOTE — ED ADULT NURSE NOTE - OBJECTIVE STATEMENT
89 y/o female hx multiple UTI presenting to ED c/o right flank pain.  Patient was diagnosed with a UTI in August; patient was asymptomatic and received 7 days of antibiotics.  Patient has been having right flank pain since yesterday afternoon.  Patient reports that she was laying down in bed when she had sudden onset SOB - comes and goes.  No fevers, chills, nausea, vomiting, chest pain, LOC, dysuria, hematuria.  No recent falls or trauma.  No history of kidney stones. 89 y/o female hx multiple UTI presenting to ED c/o right flank pain since 2 am.  Pt. states she had sudden onset pain, burning on urination, and 1 incident of SOB. At this time denies chest pain, sob, ha, n/v/d, abdominal pain, f/c, urinary symptoms, hematuria. A&Ox4 gross neuro intact, lungs cta bilaterally, no difficulty speaking in complete sentences, s1s2 heart sounds heard, pulses x 4, fine x4, abdomen soft nontender nondistended, skin intact. Safety and comfort measures maintained. Patient undressed and placed into gown, call bell in hand and side rails up for safety. warm blanket provided, vital signs stable, pt in no acute distress.

## 2018-10-08 NOTE — ED PROVIDER NOTE - PHYSICAL EXAMINATION
*Gen: NAD, AAO*3  *HEENT: NC/AT, MMM, airway patent, trachea midline  *CV: RRR, S1/S2 present, no murmurs/rubs/gallops  *Resp: no respiratory distress, LCTAB, no wheezing/rales/rhonchi  *Abd: non-distended, soft N/Tx4, no guarding or rigidity  *Neuro: no focal neuro deficits, moving all limbs appropriately  *Extremities: no gross deformity  *Skin: no rashes, no wounds   ~ Abe Ayoub M.D.

## 2018-10-08 NOTE — ED ADULT TRIAGE NOTE - CHIEF COMPLAINT QUOTE
Patient c/o right flank pain radiating to right abdominal area since 2 pm yesterday. Patient has a history of UTIs. Tonight when on bed patient had sudden SOB, resolved.
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
## 2018-10-08 NOTE — ED PROVIDER NOTE - ATTENDING CONTRIBUTION TO CARE
89F pmh [facility], multiple hip surgeries, presents with R flank pain beginning yesterday, radiates to lower abdomen. Treated for UTI in august with resolution of sx. Pt also had brief episode of sob while lying down at home but resolved. Denies cp, any other episodes sob, f/c, n/v, dysuria hematuria.    PE: Elderly female in NAD, NCAT, MMM, Trachea midline, Normal conjunctiva, lungs CTAB, S1/S2 RRR, Normal perfusion, 2+ radial pulses bilat, Abdomen Soft, +Minimal R CVA ttp, NTND, No rebound/guarding, No LE edema, No deformity of extremities, No rashes,  No focal motor or sensory deficits.    Afebrile in ED. Borderline tachycardic. +Mild r cva ttp.  Most concerning for pyelonephritis. Much lower suspicion of kidney stone. No abd ttp, very low suspicion of diverticulitis, appendicitis, cholecysittis, or other acute surgical GI pathology. Plan to obtain labs, urine, consider CT a/p stone hunt based on findings. - Ed Clemente MD

## 2018-10-08 NOTE — ED ADULT NURSE REASSESSMENT NOTE - NS ED NURSE REASSESS COMMENT FT1
Pt. currently reciveing IV abx per order. Pt admitted awaiting inpatient bed placement. VSS NAD. Safety and comfort measures maintained.
A&Ox2 - disoriented to time/situation. Pt is in no current distress. Comfort and safety provided. Will continue to monitor. Awaiting bed assignment.
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
## 2018-10-08 NOTE — H&P ADULT - NSHPSOCIALHISTORY_GEN_ALL_CORE
Family Hx: no heart disease, no cancer    Social history:  denies ETOH/ivdu/tobacco  former   lives with daughter  walks with walker or straight cane  performs own ADLs (bathing/toileting/dressing) but daughter helps with organization-picking out clothes, prepping food etc

## 2018-10-08 NOTE — H&P ADULT - ASSESSMENT
89yo F pmh mild dementia, osteoporosis with multiple prior falls, GERD p/w 1 day of intermittent right flank pain UA c/w UTI, possible pyelonephritis, cannot r/o nephrolithiasis.

## 2018-10-08 NOTE — H&P ADULT - PROBLEM SELECTOR PLAN 1
afebrile, normal wbc.  Per ED had flank pain on arrival and UA dirty. Prior urine culture reviewed with ecoli sensitive to ceftriaxone, resistant to fluoroquinolone/amp/bactrim  -continue ceftriaxone (day 1)  -f/u urine culture  -would check CT abd/pelvis no contrast to r/o kidney stone given intermittent nature of CVAT  -strain urine for stones

## 2018-10-08 NOTE — H&P ADULT - NSHPLABSRESULTS_GEN_ALL_CORE
< from: Xray Chest 1 View- PORTABLE-Urgent (10.08.18 @ 03:24) >    IMPRESSION:   No focal consolidation. Trace right effusion. Several foci of high   density project over the right side of the patient and are likely   external to the patient. Correlate clinically.    < end of copied text >    EKG personally reviewed: nsr@79, normal intervals, no q's/st changes    Culture - Urine (06.29.18 @ 16:36)    -  Amikacin: S <=8    -  Amoxicillin/Clavulanic Acid: S <=8/4    -  Ampicillin: R >16 These ampicillin results predict results for amoxicillin    -  Ampicillin/Sulbactam: R >16/8    -  Aztreonam: S <=4    -  Cefazolin: S 8 This predicts results for oral agents cefaclor, cefdinir, cefpodoxime, cefprozil, cefuroxime axetil, cephalexin and locarbef for uncomplicated UTI. Note that some isolates may be susceptible to these agents while testing resistant to cefazolin.    -  Cefepime: S <=2    -  Cefoxitin: S <=4    -  Ceftriaxone: S <=1 Enterobacter, Citrobacter, and Serratia may develop resistance during prolonged therapy    -  Ciprofloxacin: R >2    -  Ertapenem: S <=0.5    -  Gentamicin: S <=1    -  Imipenem: S <=1    -  Levofloxacin: I 4    -  Meropenem: S <=1    -  Nitrofurantoin: S <=32 Should not be used to treat pyelonephritis    -  Piperacillin/Tazobactam: S <=8    -  Tigecycline: S <=1    -  Tobramycin: S <=2    -  Trimethoprim/Sulfamethoxazole: R >2/38    Specimen Source: .Urine Clean Catch (Midstream)    Culture Results:   >100,000 CFU/ml Escherichia coli    Organism Identification: Escherichia coli    Organism: Escherichia coli    Method Type: CLIVE

## 2018-10-08 NOTE — ED PROVIDER NOTE - MEDICAL DECISION MAKING DETAILS
89 year-old female with history of TAHBSO, multiple hip surgeries, GERD presents to the Emergency Department for right flank pain.  DDx: Pyelo/UTI, MSK; unlikely kidney stone

## 2018-10-08 NOTE — H&P ADULT - COMMENTS
Gen: no fever/weight change  ENT: no dysphagia/odynophagia but crushes pills  Eyes: no vision changes  CV: no cp/palpitations  Resp: no SOB/cough  Abd: no abd pain/n/v/d/constipation/melena/brbpr  : no dysuria  heme: no epistaxis/gum bleeding  Neuro: no syncope  Psych: mild dementia  Skin: no rash

## 2018-10-08 NOTE — ED PROVIDER NOTE - CARE PLAN
Principal Discharge DX:	Pyelonephritis Principal Discharge DX:	Pyelonephritis  Secondary Diagnosis:	Hyponatremia

## 2018-10-08 NOTE — H&P ADULT - HISTORY OF PRESENT ILLNESS
89yo F pmh mild dementia, osteoporosis with multiple prior falls, GERD p/w 1 day of intermittent right flank pain.  Daughter who is an RN medicated her with tylenol but had no relief.  At 12am, patient went to daughter's room flickering light and crying from the pain, feeling unable to breathe and panicky.  Denies fevers/night sweats/nausea/vomiting/abdominal pain/dysuria.  In September at routine PCP appointment she was diagnosed with asymptomatic UTI and given 1 week of abx (daughter believes it was bactrim).  Has been eating/drinking well.    In ED: ceftriaxone 1g @ 5am, tylenol 1g @3am

## 2018-10-08 NOTE — H&P ADULT - PROBLEM SELECTOR PLAN 5
high functioning baseline for her age  PT eval given history of falls (no recent falls)  fall precautions

## 2018-10-09 LAB
24R-OH-CALCIDIOL SERPL-MCNC: 19.8 NG/ML — LOW (ref 30–80)
ANION GAP SERPL CALC-SCNC: 12 MMOL/L — SIGNIFICANT CHANGE UP (ref 5–17)
BASOPHILS # BLD AUTO: 0.03 K/UL — SIGNIFICANT CHANGE UP (ref 0–0.2)
BASOPHILS NFR BLD AUTO: 0.3 % — SIGNIFICANT CHANGE UP (ref 0–2)
BUN SERPL-MCNC: 8 MG/DL — SIGNIFICANT CHANGE UP (ref 7–23)
CALCIUM SERPL-MCNC: 9.5 MG/DL — SIGNIFICANT CHANGE UP (ref 8.4–10.5)
CHLORIDE SERPL-SCNC: 100 MMOL/L — SIGNIFICANT CHANGE UP (ref 96–108)
CO2 SERPL-SCNC: 23 MMOL/L — SIGNIFICANT CHANGE UP (ref 22–31)
CREAT SERPL-MCNC: 0.76 MG/DL — SIGNIFICANT CHANGE UP (ref 0.5–1.3)
CULTURE RESULTS: SIGNIFICANT CHANGE UP
EOSINOPHIL # BLD AUTO: 0.14 K/UL — SIGNIFICANT CHANGE UP (ref 0–0.5)
EOSINOPHIL NFR BLD AUTO: 1.4 % — SIGNIFICANT CHANGE UP (ref 0–6)
GLUCOSE SERPL-MCNC: 173 MG/DL — HIGH (ref 70–99)
HCT VFR BLD CALC: 39.1 % — SIGNIFICANT CHANGE UP (ref 34.5–45)
HGB BLD-MCNC: 13.1 G/DL — SIGNIFICANT CHANGE UP (ref 11.5–15.5)
IMM GRANULOCYTES NFR BLD AUTO: 0.3 % — SIGNIFICANT CHANGE UP (ref 0–1.5)
LYMPHOCYTES # BLD AUTO: 1.82 K/UL — SIGNIFICANT CHANGE UP (ref 1–3.3)
LYMPHOCYTES # BLD AUTO: 18.3 % — SIGNIFICANT CHANGE UP (ref 13–44)
MAGNESIUM SERPL-MCNC: 1.9 MG/DL — SIGNIFICANT CHANGE UP (ref 1.6–2.6)
MCHC RBC-ENTMCNC: 32.3 PG — SIGNIFICANT CHANGE UP (ref 27–34)
MCHC RBC-ENTMCNC: 33.5 GM/DL — SIGNIFICANT CHANGE UP (ref 32–36)
MCV RBC AUTO: 96.5 FL — SIGNIFICANT CHANGE UP (ref 80–100)
MONOCYTES # BLD AUTO: 1 K/UL — HIGH (ref 0–0.9)
MONOCYTES NFR BLD AUTO: 10.1 % — SIGNIFICANT CHANGE UP (ref 2–14)
NEUTROPHILS # BLD AUTO: 6.9 K/UL — SIGNIFICANT CHANGE UP (ref 1.8–7.4)
NEUTROPHILS NFR BLD AUTO: 69.6 % — SIGNIFICANT CHANGE UP (ref 43–77)
PHOSPHATE SERPL-MCNC: 3.1 MG/DL — SIGNIFICANT CHANGE UP (ref 2.5–4.5)
PLATELET # BLD AUTO: 275 K/UL — SIGNIFICANT CHANGE UP (ref 150–400)
POTASSIUM SERPL-MCNC: 3.9 MMOL/L — SIGNIFICANT CHANGE UP (ref 3.5–5.3)
POTASSIUM SERPL-SCNC: 3.9 MMOL/L — SIGNIFICANT CHANGE UP (ref 3.5–5.3)
RBC # BLD: 4.05 M/UL — SIGNIFICANT CHANGE UP (ref 3.8–5.2)
RBC # FLD: 15.7 % — HIGH (ref 10.3–14.5)
SODIUM SERPL-SCNC: 135 MMOL/L — SIGNIFICANT CHANGE UP (ref 135–145)
SPECIMEN SOURCE: SIGNIFICANT CHANGE UP
WBC # BLD: 9.92 K/UL — SIGNIFICANT CHANGE UP (ref 3.8–10.5)
WBC # FLD AUTO: 9.92 K/UL — SIGNIFICANT CHANGE UP (ref 3.8–10.5)

## 2018-10-09 PROCEDURE — 99233 SBSQ HOSP IP/OBS HIGH 50: CPT

## 2018-10-09 RX ORDER — HALOPERIDOL DECANOATE 100 MG/ML
1 INJECTION INTRAMUSCULAR EVERY 6 HOURS
Qty: 0 | Refills: 0 | Status: DISCONTINUED | OUTPATIENT
Start: 2018-10-09 | End: 2018-10-10

## 2018-10-09 RX ADMIN — Medication 5 MILLIGRAM(S): at 21:07

## 2018-10-09 RX ADMIN — Medication 1 TABLET(S): at 17:44

## 2018-10-09 RX ADMIN — HEPARIN SODIUM 5000 UNIT(S): 5000 INJECTION INTRAVENOUS; SUBCUTANEOUS at 17:43

## 2018-10-09 NOTE — PHYSICAL THERAPY INITIAL EVALUATION ADULT - TRANSFER TRAINING, PT EVAL
GOAL: Pt will perform ALL transfers with S assist, w/use of appropriate assistive device as needed, in 4 weeks.

## 2018-10-09 NOTE — PHYSICAL THERAPY INITIAL EVALUATION ADULT - PERTINENT HX OF CURRENT PROBLEM, REHAB EVAL
89 yo F pmh mild dementia, osteoporosis with multiple prior falls, GERD p/w 1 day of intermittent right flank pain.  Daughter who is an RN medicated her with tylenol but had no relief. At 12am, patient went to daughter's room flickering light and crying from the pain, feeling unable to breathe and panicky.

## 2018-10-09 NOTE — PHYSICAL THERAPY INITIAL EVALUATION ADULT - PRECAUTIONS/LIMITATIONS, REHAB EVAL
In September at routine PCP appointment she was diagnosed with asymptomatic UTI and given 1 week of abx. In ED pt given ceftriaxone 1g @ 5am, tylenol 1g @3am. CXr 10/8: No focal consolidation. Trace right effusion. Several foci of high density project over the right side of the patient and are likely external to the patient. CT Abdomen/pelvis 10/8: No hydronephrosis or radiopaque renal calculus. Cholelithiasis.Small right pleural effusion with adjacent atelectasis. Diffuse hepatic steatosis./fall precautions

## 2018-10-09 NOTE — PROGRESS NOTE ADULT - PROBLEM SELECTOR PLAN 5
Per daughter, patient is currently at her mental baseline. Has been exhibiting worsening cognitive decline with memory problems, mild paranoid delusions over the past year.   - requesting psych consult for medication recommendations  - PT yue given history of falls (no recent falls)  - fall precautions  - 1: 1 for now pending psych recs Per daughter, patient is currently at her mental baseline. Has been exhibiting worsening cognitive decline with memory problems, mild paranoid delusions over the past year.   - haldol 1mg im q6 hours prn combativeness or agitation  - requesting psych consult for medication recommendations  - PT eval given history of falls (no recent falls)  - fall precautions  - 1: 1 for now pending psych recs

## 2018-10-09 NOTE — PHYSICAL THERAPY INITIAL EVALUATION ADULT - ADDITIONAL COMMENTS
As per chart and CM notes pt lives in a PH with her daughter, +13 steps to negotiate with HR. Pt was ambulating with assist and straight cane, R/W intermittently. Pt required assist with all ADLs. Owns straight cane, R/W and shower chair
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
## 2018-10-09 NOTE — PHYSICAL THERAPY INITIAL EVALUATION ADULT - PLANNED THERAPY INTERVENTIONS, PT EVAL
balance training/bed mobility training/stair negotiation GOAL: pt will ascend/descend 13 steps with S assist and B/L HR via step to pattern in 4 weeks/strengthening/gait training/transfer training

## 2018-10-10 ENCOUNTER — TRANSCRIPTION ENCOUNTER (OUTPATIENT)
Age: 83
End: 2018-10-10

## 2018-10-10 VITALS
HEART RATE: 77 BPM | SYSTOLIC BLOOD PRESSURE: 147 MMHG | TEMPERATURE: 98 F | OXYGEN SATURATION: 95 % | RESPIRATION RATE: 18 BRPM | DIASTOLIC BLOOD PRESSURE: 91 MMHG | WEIGHT: 146.61 LBS

## 2018-10-10 DIAGNOSIS — R41.0 DISORIENTATION, UNSPECIFIED: ICD-10-CM

## 2018-10-10 LAB
FOLATE SERPL-MCNC: 8.4 NG/ML — SIGNIFICANT CHANGE UP
TSH SERPL-MCNC: 4.18 UIU/ML — SIGNIFICANT CHANGE UP (ref 0.27–4.2)
TSH SERPL-MCNC: 4.56 UIU/ML — HIGH (ref 0.27–4.2)
VIT B12 SERPL-MCNC: 164 PG/ML — LOW (ref 232–1245)

## 2018-10-10 PROCEDURE — 83735 ASSAY OF MAGNESIUM: CPT

## 2018-10-10 PROCEDURE — 99285 EMERGENCY DEPT VISIT HI MDM: CPT | Mod: 25

## 2018-10-10 PROCEDURE — 82306 VITAMIN D 25 HYDROXY: CPT

## 2018-10-10 PROCEDURE — 84484 ASSAY OF TROPONIN QUANT: CPT

## 2018-10-10 PROCEDURE — 74176 CT ABD & PELVIS W/O CONTRAST: CPT

## 2018-10-10 PROCEDURE — 99223 1ST HOSP IP/OBS HIGH 75: CPT | Mod: GC

## 2018-10-10 PROCEDURE — 83880 ASSAY OF NATRIURETIC PEPTIDE: CPT

## 2018-10-10 PROCEDURE — 84443 ASSAY THYROID STIM HORMONE: CPT

## 2018-10-10 PROCEDURE — 71045 X-RAY EXAM CHEST 1 VIEW: CPT

## 2018-10-10 PROCEDURE — 93005 ELECTROCARDIOGRAM TRACING: CPT

## 2018-10-10 PROCEDURE — 82607 VITAMIN B-12: CPT

## 2018-10-10 PROCEDURE — 80053 COMPREHEN METABOLIC PANEL: CPT

## 2018-10-10 PROCEDURE — 85730 THROMBOPLASTIN TIME PARTIAL: CPT

## 2018-10-10 PROCEDURE — 85027 COMPLETE CBC AUTOMATED: CPT

## 2018-10-10 PROCEDURE — 81001 URINALYSIS AUTO W/SCOPE: CPT

## 2018-10-10 PROCEDURE — 96374 THER/PROPH/DIAG INJ IV PUSH: CPT

## 2018-10-10 PROCEDURE — 82746 ASSAY OF FOLIC ACID SERUM: CPT

## 2018-10-10 PROCEDURE — 87086 URINE CULTURE/COLONY COUNT: CPT

## 2018-10-10 PROCEDURE — 97161 PT EVAL LOW COMPLEX 20 MIN: CPT

## 2018-10-10 PROCEDURE — 85610 PROTHROMBIN TIME: CPT

## 2018-10-10 PROCEDURE — 84100 ASSAY OF PHOSPHORUS: CPT

## 2018-10-10 PROCEDURE — 80048 BASIC METABOLIC PNL TOTAL CA: CPT

## 2018-10-10 PROCEDURE — 99239 HOSP IP/OBS DSCHRG MGMT >30: CPT

## 2018-10-10 RX ORDER — CHOLECALCIFEROL (VITAMIN D3) 125 MCG
1000 CAPSULE ORAL DAILY
Qty: 0 | Refills: 0 | Status: DISCONTINUED | OUTPATIENT
Start: 2018-10-10 | End: 2018-10-10

## 2018-10-10 RX ORDER — CHOLECALCIFEROL (VITAMIN D3) 125 MCG
1000 CAPSULE ORAL
Qty: 0 | Refills: 0 | DISCHARGE
Start: 2018-10-10

## 2018-10-10 RX ORDER — CHOLECALCIFEROL (VITAMIN D3) 125 MCG
1000 CAPSULE ORAL
Qty: 30 | Refills: 0
Start: 2018-10-10 | End: 2018-11-08

## 2018-10-10 RX ORDER — ACETAMINOPHEN 500 MG
2 TABLET ORAL
Qty: 0 | Refills: 0 | DISCHARGE
Start: 2018-10-10

## 2018-10-10 RX ADMIN — Medication 1000 UNIT(S): at 14:59

## 2018-10-10 RX ADMIN — Medication 650 MILLIGRAM(S): at 03:20

## 2018-10-10 RX ADMIN — PANTOPRAZOLE SODIUM 40 MILLIGRAM(S): 20 TABLET, DELAYED RELEASE ORAL at 05:09

## 2018-10-10 RX ADMIN — Medication 650 MILLIGRAM(S): at 04:20

## 2018-10-10 RX ADMIN — Medication 1 TABLET(S): at 05:08

## 2018-10-10 RX ADMIN — HEPARIN SODIUM 5000 UNIT(S): 5000 INJECTION INTRAVENOUS; SUBCUTANEOUS at 05:08

## 2018-10-10 NOTE — PROGRESS NOTE ADULT - PROBLEM SELECTOR PLAN 6
sqh q12  incentive spirometry    Dispo: home today. Discussed plan with NP and pt's Daughter. [facility] information given    Total discharge time: 46 minutes.
sqh q12  incentive spirometry

## 2018-10-10 NOTE — PROGRESS NOTE ADULT - PROBLEM SELECTOR PLAN 5
Per daughter, patient is currently at her mental baseline. Has been exhibiting worsening cognitive decline with memory problems, mild paranoid delusions over the past year.   - psych evaluated pt, recommended OP [facility] psych eval. Pt's daughter given number for [facility].

## 2018-10-10 NOTE — BEHAVIORAL HEALTH ASSESSMENT NOTE - NSBHSUICPROTECTFACT_PSY_A_CORE
Identifies reasons for living/Supportive social network or family/Fear of death or dying due to pain/suffering/Ability to cope with stress/Responsibility to family and others/Future oriented/High spirituality

## 2018-10-10 NOTE — PROGRESS NOTE ADULT - ASSESSMENT
89yo F pmh mild dementia, osteoporosis with multiple prior falls, GERD p/w 1 day of intermittent right flank pain UA c/w UTI, possible pyelonephritis, cannot r/o nephrolithiasis.
89yo F pmh mild dementia, osteoporosis with multiple prior falls, GERD p/w 1 day of intermittent right flank pain UA c/w UTI, possible pyelonephritis, cannot r/o nephrolithiasis.

## 2018-10-10 NOTE — PROGRESS NOTE ADULT - PROBLEM SELECTOR PLAN 4
check vitamin d  multiple prior falls/fractures but not on supplements
check vitamin d  multiple prior falls/fractures but not on supplements

## 2018-10-10 NOTE — DISCHARGE NOTE ADULT - NSCORESITESY/N_GEN_A_CORE_RD
Demographics   This is  Yuri Rasmussen who is a 64 year old  male admitted 1/24/2018  3:56 PM with NECTOTIZING CELLULITIS   Subjective  Remains afebrile.  He feels he is slowly improving.  PICC functional but he still has copious serosanguinous fluid from the right arm PICC site.             ALLERGIES:   Allergen Reactions   • Viscopaste [Gelpad Dressing] RASH   • Bactrim RASH   • Calamine RASH   • Dalvance [Dalbavancin] RASH   • Daptomycin RASH   • Doxycycline RASH   • Latex RASH   • Lisinopril NAUSEA   • Neosporin [Neomycin-Bacitracin-Polymyxin]    • Vancomycin RASH      Medications  • meropenem (MERREM) IVPB  500 mg Intravenous 4 times per day   • ceFAZolin  2,000 mg Intravenous 3 times per day   • heparin (porcine)  5 mL Intercatheter Daily   • acetic acid   Irrigation 2 times per day   • sodium chloride (PF)  2 mL Injection 2 times per day   • folic acid  400 mcg Oral Daily   • methaDONE  75 mg Oral 2 times per day   • polyethylene glycol  17 g Oral Daily   • fluticasone  2 spray Each Nare Daily   • losartan  50 mg Oral Daily         Continuous infusions:     PRN Meds: alteplase, heparin (porcine), sodium chloride (PF), sodium chloride (PF), sodium chloride (PF), ibuprofen     Objective    Visit Vitals  /82 (BP Location: [facility], Patient Position: Sitting)   Pulse 74   Temp 98 °F (36.7 °C) (Oral)   Resp 16   Ht 5' 9\" (1.753 m)   Wt 81.8 kg   SpO2 95%   BMI 26.63 kg/m²      General:  comfortable in no distress    Skin:  papular rash previulsy identified from the knees down to the feet, currently wrapped, no other rash noted. RUE PICC copious serosanguinous drainage soaked through the current bandage   Heent:  no thrush    Lungs:  clear    Heart:  normal S1 and S2    Abd:  soft nontender    Ext:  (per prior exam, did not remove dressings just changed) Continues to have notable improvement in his leg wounds. Less erythema, skin less dry and cracked, minimal tenderness. Open wounds with emulsion dressings in  place, and some drainage present. See photos that type taken below - taken 1/25.  The erythema and edema are improved today. Has open ulcers with yellow purulent fibrinous tissue in the wound bed.  Large scales.   Media Information          Document Information     Photo/Graph/Diagram   Necrotizing cellulitis    1/25/2018  4:46 PM   Attached To:   Hospital Encounter on 1/24/18   Source Information     MD Celine Mcnulty [facility]     Media Information          Document Information     Photo/Graph/Diagram   Necrotizing cellulitis    1/25/2018  4:46 PM   Attached To:   Hospital Encounter on 1/24/18   Source Information     MD C[facility] Mcn[facility]     Media Information          Document Information     Photo/Graph/Diagram   Same   1/25/2018  4:46 PM   Attached To:   Hospital Encounter on 1/24/18   Source Information     MD C[facility] Mcnulty [facility]          Neuro:  nonfocal             Recent Labs  Lab 01/31/18  1055 01/25/18  0504   WBC 7.2 8.7   RBC 4.15* 3.72*   HGB 12.0* 10.4*   HCT 36.5* 33.4*   MCV 88.0 89.8   MCH 28.9 28.0   MCHC 32.9 31.1*    244         Recent Labs  Lab 01/31/18  1055 01/25/18  0504   CALCIUM 9.2 8.6   BUN 20 16   ALBUMIN  --  2.8*   AST  --  7   CO2 31 32   CREATININE 0.75 0.93   ANIONGAP 10 9*       No results found       Micro  Pseudomonas and methicillin sensitive staph aureus      Radiology  Nothing new          Assessment:  1. Necrotizing cellulitis of B feet due to Pseudomonas and MSSA  -pseudomonas is FQ resistant, profile noted  -gradual  progress  2. Diabetes mellitus  3. Eczema  4. Multiple drug allergies - see his profile. He has had severe rash to vancomycin, daptomycin, dalvance. All of these have been tried. Also with allergy to topical neomycin, will not use topical gentamicin therefore  5. PICC site drainage but no local rash, unclear cause.  US ok          Plan:  1. Acetic acid wet to dry BID to continue as per prior orders   2. Continue  IV cefazolin and meropenem. Has very limited antibiotic options, so will plan for this, despite double beta-lactam therapy  3. Orders for homecare adjusted yesterday for the cefazolin and merrem IV  4.  No issues with discharge from ID standpoint if home abx in place  5. I have ordered for removal of the RUE PICC.  A Midline catheter will be placed in the left arm today. This can only be in place for 2 weeks, but may only need 2 weeks further IV abx.     Alondra Braga PA-C     -1/31/2018                      No

## 2018-10-10 NOTE — BEHAVIORAL HEALTH ASSESSMENT NOTE - NSBHCHARTREVIEWVS_PSY_A_CORE FT
Vital Signs Last 24 Hrs  T(C): 36.8 (10 Oct 2018 10:14), Max: 36.8 (10 Oct 2018 10:14)  T(F): 98.2 (10 Oct 2018 10:14), Max: 98.2 (10 Oct 2018 10:14)  HR: 77 (10 Oct 2018 10:14) (77 - 90)  BP: 147/91 (10 Oct 2018 10:14) (144/75 - 149/78)  BP(mean): --  RR: 18 (10 Oct 2018 10:14) (18 - 18)  SpO2: 95% (10 Oct 2018 10:14) (95% - 99%)

## 2018-10-10 NOTE — DISCHARGE NOTE ADULT - PATIENT PORTAL LINK FT
You can access the [email or web address], offered by [facility], by registering with the following website: [email or web address]

## 2018-10-10 NOTE — BEHAVIORAL HEALTH ASSESSMENT NOTE - NSBHCONSULTFOLLOWAFTERCARE_PSY_A_CORE FT
per primary team 1. Patient should follow up with the [facility] for further psychiatric needs - [phone]

## 2018-10-10 NOTE — DISCHARGE NOTE ADULT - CARE PROVIDER_API CALL
Bhavin Gardner), Internal Medicine  [address] [facility] [address] [address]  Phone: [phone]  Fax: [phone]

## 2018-10-10 NOTE — DISCHARGE NOTE ADULT - CARE PLAN
Principal Discharge DX:	Urinary tract infection  Goal:	complete antibiotic course and follow up with primary doctor  Assessment and plan of treatment:	You were treated with antibiotics for a urinary tract infection. You should complete your entire course of antibiotics, do not save any tablets for the future. You may experience some side effects such as GI discomfort/indigestion, nausea/vomiting or diarrhea. If these side effects are disabling, you should go to your nearest ED. If you experience recurrent fevers, abdominal pain, blood in your urine, you should go to your nearest ED. You should also follow up with your primary care doctor within 10 days of discharge.  Secondary Diagnosis:	Dementia without behavioral disturbance, unspecified dementia type  Goal:	Follow up with geriatric psychiatry  Assessment and plan of treatment:	You were evaluated by the inpatient psychiatry team, who deemed you appropriate for discharge to home with instructions to establish care with a geriatric psychiatrist at [facility] psychiatric clinic:     [phone]  Secondary Diagnosis:	GERD (gastroesophageal reflux disease)  Goal:	Continue proton pump inhibitor medication  Assessment and plan of treatment:	Your acid reflux will be managed with daily proton pump inhibitor medication. You are advised to avoid laying down within 2 hours of eating, and should avoid excessive coffee, tea, and alcohol. You should also follow up with your primary care doctor within 10 days of discharge.  Secondary Diagnosis:	Hip fracture  Goal:	Start vitamin D  Assessment and plan of treatment:	You should take daily vitamin D to help decrease your risk of future falls and fractures, and should have your vitamin D levels monitored by your primary care doctor.

## 2018-10-10 NOTE — BEHAVIORAL HEALTH ASSESSMENT NOTE - RISK ASSESSMENT
Patient is at mildly elevated risk because of her delirium superimposed onto likely dementia. Other risk factors include her age,  status, mobility limitations, and lack of engagement in school or work. Protective factors include strong social support, Denominational objections to suicide, lack of access to firearms, responsibility to family, and optimistic point of view.

## 2018-10-10 NOTE — BEHAVIORAL HEALTH ASSESSMENT NOTE - CASE SUMMARY
Lisandra Archer is a 89 yo F with no formal past psychiatric history, possible history of dementia who presented with right flank pain on 10/8. She was admitted for concern for pyelonephritis. Patient was combative and agitated the evening of October 8th. She was given Haldol 1 mg and placed on a 1:1. Psychiatry was consulted for agitation and medication recommendations.     I have seen and evaluated this patient myself. Chart, labs, meds reviewed. I agree with resident's assessment and plan.

## 2018-10-10 NOTE — BEHAVIORAL HEALTH ASSESSMENT NOTE - SUMMARY
Lisandra Archer is a 89 yo F with no formal past psychiatric history, possible history of dementia who presented with right flank pain on 10/8. She was admitted for concern for pyelonephritis. Patient was combative and agitated the evening of October 8th. She was given Haldol 1 mg and placed on a 1:1. Psychiatry was consulted for agitation and medication recommendations. Patient has not required any PRNs over the past day. On interview she is confused and disoriented but in good behavioral control. Patient appears to have a months long history of cognitive decline per daughter. Her presentation appears to be consistent dementia and exacerbated by delirium. Patient would benefit from outpatient evaluation for dementia but does not acutely require neuroleptic medications at discharge. She does not meet criteria for inpatient psychiatric admission.

## 2018-10-10 NOTE — DISCHARGE NOTE ADULT - HOSPITAL COURSE
89f hx hip fracture and cognitive decline x 1 year presented with R flank pain and positive UA concerning for UTI. Patient was afebrile and not septic on admission. CT scan ruled out renal stone. Patient did not exhibit any signs/symptoms of pyelonephritis. Urine culture was positive for aerococcus, sensitive to augmenting. Patient was seen by psychiatry for progressively worsening cognitive function and mood problems at home; they recommended outpatient follow up with [facility]. Pt was deemed stable for discharge on PO Augmentin, and with instructions to f/u with [facility] and her primary care doctor. Clinic phone number was provided.

## 2018-10-10 NOTE — BEHAVIORAL HEALTH ASSESSMENT NOTE - NSBHCHARTREVIEWIMAGING_PSY_A_CORE FT
< from: CT Abdomen and Pelvis No Cont (10.08.18 @ 12:06) >    No hydronephrosis or radiopaque renal calculus.  Cholelithiasis.  Small right pleural effusion with adjacent atelectasis.  Diffuse hepatic steatosis.

## 2018-10-10 NOTE — BEHAVIORAL HEALTH ASSESSMENT NOTE - NSBHCHARTREVIEWINVESTIGATE_PSY_A_CORE FT
< from: 12 Lead ECG (10.08.18 @ 17:50) >      Ventricular Rate 134 BPM    Atrial Rate 134 BPM    P-R Interval 168 ms    QRS Duration 48 ms    Q-T Interval 262 ms    QTC Calculation(Bezet) 391 ms    P Axis 27 degrees    R Axis -16 degrees    T Axis 11 degrees    Diagnosis Line SINUS TACHYCARDIA    < end of copied text >

## 2018-10-10 NOTE — PROGRESS NOTE ADULT - PROBLEM SELECTOR PLAN 1
Afebrile, nontoxic appearing, did not satisfy sepsis criteria on admission. Doubt pyelonephritis. No flank pain or tenderness at present. Pt lost IV access overnight.  - CT a/p negative  - does not require ongoing IV therapy for UTI- will switch to augmentin 500/125 po q12 x 6 more days for total 7 days
Afebrile, nontoxic appearing, did not satisfy sepsis criteria on admission. Doubt pyelonephritis. No flank pain or tenderness at present. Pt lost IV access overnight.  - CT a/p negative  - does not require ongoing IV therapy for UTI- will switch to augmentin 500/125 po q12 x 6 more days for total 7 days

## 2018-10-10 NOTE — BEHAVIORAL HEALTH ASSESSMENT NOTE - NSBHCONSULTRECOMMENDOTHER_PSY_A_CORE FT
1. Patient should follow up with the [facility] for further psychiatric needs - [phone] 1. Delirium is a condition that is secondary an underlying condition (infection, electrolyte imbalance, etc.) and is best treated by treating the underlying condition  2. Consider avoiding medications that may exacerbate delirium including benzodiazepines and anti-cholinergic medications  3. Frequent reorientation and cognitive stimuli such as visits from family and friends are both helpful ways to avoid exacerbating delirium  4. Consider facilitating physiologic sleep by avoiding frequent nighttime interruptions  5. If patient requires hearing or visual aids, would recommend providing these to the patient

## 2018-10-10 NOTE — PROGRESS NOTE ADULT - SUBJECTIVE AND OBJECTIVE BOX
Patient is a 89y old  Female who presents with a chief complaint of right flank pain (10 Oct 2018 11:12)  Subjective: No acute events overnight, no agitation reported. Today patient feels well, no fevers, no flank pain, no cough/sob, no new complaints.    VITAL SIGNS:  Vital Signs Last 24 Hrs  T(C): 36.8 (10 Oct 2018 10:14), Max: 36.8 (10 Oct 2018 10:14)  T(F): 98.2 (10 Oct 2018 10:14), Max: 98.2 (10 Oct 2018 10:14)  HR: 77 (10 Oct 2018 10:14) (77 - 90)  BP: 147/91 (10 Oct 2018 10:14) (144/75 - 149/78)  BP(mean): --  RR: 18 (10 Oct 2018 10:14) (18 - 18)  SpO2: 95% (10 Oct 2018 10:14) (95% - 99%)      PHYSICAL EXAM:     GENERAL: no acute distress  HEENT: PERRLA, EOMI, moist oropharynx   RESPIRATORY: CTAB, no w/c   CARDIOVASCULAR: RRR, no murmurs, gallops, rubs  ABDOMINAL: soft, non-tender, non-distended, positive bowel sounds   EXTREMITIES: no clubbing, cyanosis, or edema  NEUROLOGICAL: alert and oriented x 1 (self), non-focal  SKIN: no rashes or lesions   MUSCULOSKELETAL: no gross joint deformity                          13.1   9.92  )-----------( 275      ( 09 Oct 2018 10:55 )             39.1     10-09    135  |  100  |  8   ----------------------------<  173<H>  3.9   |  23  |  0.76    Ca    9.5      09 Oct 2018 10:41  Phos  3.1     10-09  Mg     1.9     10-09        CAPILLARY BLOOD GLUCOSE          MEDICATIONS  (STANDING):  amoxicillin  500 milliGRAM(s)/clavulanate 1 Tablet(s) Oral two times a day  cholecalciferol 1000 Unit(s) Oral daily  heparin  Injectable 5000 Unit(s) SubCutaneous every 12 hours  influenza   Vaccine 0.5 milliLiter(s) IntraMuscular once  melatonin 5 milliGRAM(s) Oral at bedtime  pantoprazole    Tablet 40 milliGRAM(s) Oral before breakfast    MEDICATIONS  (PRN):  acetaminophen   Tablet .. 650 milliGRAM(s) Oral every 6 hours PRN Moderate Pain (4 - 6)  haloperidol    Injectable 1 milliGRAM(s) IntraMuscular every 6 hours PRN Agitation
Patient is a 89y old  Female who presents with a chief complaint of right flank pain (08 Oct 2018 11:55)  Subjective: patient agitated and combative overnight, placed on 1:1. This morning, she is redirectable and more calm, but anxious to speak to her daughter. No more flank pain reported, no fevers/chills, no cough/sob, no dysuria although does report +frequency, no n/v.    VITAL SIGNS:  Vital Signs Last 24 Hrs  T(C): 36.8 (09 Oct 2018 07:41), Max: 37.1 (08 Oct 2018 14:50)  T(F): 98.3 (09 Oct 2018 07:41), Max: 98.7 (08 Oct 2018 14:50)  HR: 84 (09 Oct 2018 07:41) (80 - 86)  BP: 142/76 (09 Oct 2018 07:41) (134/68 - 142/76)  BP(mean): --  RR: 18 (09 Oct 2018 07:41) (18 - 18)  SpO2: 97% (09 Oct 2018 07:41) (96% - 97%)      PHYSICAL EXAM:     GENERAL: no acute distress  HEENT: PERRLA, EOMI, moist oropharynx   RESPIRATORY: CTAB, no w/c   CARDIOVASCULAR: RRR, no murmurs, gallops, rubs  ABDOMINAL: soft, non-tender, non-distended, positive bowel sounds  : no CVA tenderness, no suprapubic tenderness   EXTREMITIES: +chronic edema and venous statsis changes  NEUROLOGICAL: alert and oriented x 1 (self), non-focal  SKIN: no rashes or lesions   MUSCULOSKELETAL: no gross joint deformity  PSYCH: +exhibits delusions                          13.1   9.92  )-----------( 275      ( 09 Oct 2018 10:55 )             39.1     10-09    135  |  100  |  8   ----------------------------<  173<H>  3.9   |  23  |  0.76    Ca    9.5      09 Oct 2018 10:41  Phos  3.1     10-09  Mg     1.9     10-09    TPro  7.8  /  Alb  3.2<L>  /  TBili  0.7  /  DBili  x   /  AST  50<H>  /  ALT  21  /  AlkPhos  103  10-08      CAPILLARY BLOOD GLUCOSE    MEDICATIONS  (STANDING):  amoxicillin  500 milliGRAM(s)/clavulanate 1 Tablet(s) Oral two times a day  heparin  Injectable 5000 Unit(s) SubCutaneous every 12 hours  influenza   Vaccine 0.5 milliLiter(s) IntraMuscular once  melatonin 5 milliGRAM(s) Oral at bedtime  pantoprazole    Tablet 40 milliGRAM(s) Oral before breakfast    MEDICATIONS  (PRN):  acetaminophen   Tablet .. 650 milliGRAM(s) Oral every 6 hours PRN Moderate Pain (4 - 6)

## 2018-10-10 NOTE — PROGRESS NOTE ADULT - PROBLEM SELECTOR PROBLEM 5
Alzheimer's dementia without behavioral disturbance, unspecified timing of dementia onset
Alzheimer's dementia without behavioral disturbance, unspecified timing of dementia onset

## 2018-10-10 NOTE — BEHAVIORAL HEALTH ASSESSMENT NOTE - HPI (INCLUDE ILLNESS QUALITY, SEVERITY, DURATION, TIMING, CONTEXT, MODIFYING FACTORS, ASSOCIATED SIGNS AND SYMPTOMS)
Lisandra Archer is a 89 yo F with no formal past psychiatric history, possible history of dementia who presented with right flank pain on 10/8. She was admitted for concern for pyelonephritis.     Patient began experiencing right flank pain in the day leading up to her admission. The pain was so great that she began crying and experiencing sob. She was admitted and started on IV ceftriaxone. She was also found to be hyponatremic which improved after fluids. Her labs and imaging were more consistent with UTI rather than pylonephritis and she was switched to PO abx. Patient was combative and agitated the evening of October 8th. She was given Haldol 1 mg and placed on a 1:1. Psychiatry was consulted for agitation and medication recommendations.     Ms. Archer is pleasant and calm on interview. She was oriented to self and place but not time. She was Lisandra Archer is a 89 yo F with no formal past psychiatric history, possible history of dementia who presented with right flank pain on 10/8. She was admitted for concern for pyelonephritis.     Patient began experiencing right flank pain in the day leading up to her admission. The pain was so great that she began crying and experiencing sob. She was admitted and started on IV ceftriaxone. She was also found to be hyponatremic which improved after fluids. Her labs and imaging were more consistent with UTI rather than pylonephritis and she was switched to PO abx. Patient was combative and agitated the evening of October 8th. She was given Haldol 1 mg and placed on a 1:1. Psychiatry was consulted for agitation and medication recommendations.     Ms. Archer is pleasant and calm on interview. She was oriented to self and place but not time. She admits that she sometimes has trouble with her memory but was minimized the extent of her memory deficits - replying, "who doesn't" when directly asked if she had trouble with her memory. Patient was attentive to interview but often had trouble remembering sequences of events. She also had trouble remembering the names of her grandchildren and the names of friends. While she was confused, she was engaged and often made jokes with the interviewer - i.e. "I think it's great to have friends, if only they would stay alive." She admitted that it was hard for her to lose friends and that she missed her  (who passed away) but that she gets through difficult times with the help of prayer. She denies any low mood or poor appetite. She denies having excessive guilt. She denied history of manic symptoms or psychotic symptoms and did not appear to be responding to internal stimuli. She denied having any history of suicidal ideation or present suicidal ideation, intent, or plan. She states that suicide is against her [employer] beliefs.     Collateral was obtained from her daughter who states that the patient was not oriented to place or time when she say the patient yesterday. She often asks the same questions over and over. She states that the patient has been more confused in the hospital and is generally more confused when she leaves her house. She denies that the patient has a history of violence, leaving the house, or engaging in dangerous behaviors at home. She states that the patient has mentally declined rapidly over the past 6 months. Patient often forgets interactions or talks about interactions that have never occurred. She has a poor notion of time and sometimes thinks that she's still in [place]. The patient also has been experiencing personality changes, becoming meaner and "nasty" to her family at night. She also began cursing, which is new for her. She saw a PCP in August who suggested that the patient see a neurologist.     Of note the patient was found to have a 1.3 cm meningioma on CT head in July 2018. Per the patient's daughter, providers planned to manage it via surveillance.     Patient had been living independently in [place] until 2015 but her daughter brought her up to [place] to live with her due to a mechanical fall. At home the patient is able to feed herself but the daughter doesn't feel safe with the patient staying home alone, as she has trouble with balance and uses a cane.

## 2018-10-10 NOTE — DISCHARGE NOTE ADULT - MEDICATION SUMMARY - MEDICATIONS TO TAKE
I will START or STAY ON the medications listed below when I get home from the hospital:    acetaminophen 325 mg oral tablet  -- 2 tab(s) by mouth every 6 hours, As needed, Moderate Pain (4 - 6)  -- Indication: For fever    melatonin 5 mg oral tablet  -- 1 tab(s) by mouth once a day (at bedtime)  -- Indication: For sleep    amoxicillin-clavulanate 500 mg-125 mg oral tablet  -- 1 tab(s) by mouth 2 times a day  -- Indication: For UTI    NexIUM 40 mg oral delayed release capsule  -- 1 cap(s) by mouth once a day  -- Indication: For Acid reducer    cholecalciferol oral tablet  -- 1000 unit(s) by mouth once a day  -- Indication: For vitamin D    cholecalciferol oral tablet  -- 1000 unit(s) by mouth once a day  -- Indication: For vitamin D

## 2018-10-10 NOTE — BEHAVIORAL HEALTH ASSESSMENT NOTE - NSBHCHARTREVIEWLAB_PSY_A_CORE FT
13.1   9.92  )-----------( 275      ( 09 Oct 2018 10:55 )             39.1     10-09    135  |  100  |  8   ----------------------------<  173<H>  3.9   |  23  |  0.76    Ca    9.5      09 Oct 2018 10:41  Phos  3.1     10-09  Mg     1.9     10-09

## 2018-10-10 NOTE — PROGRESS NOTE ADULT - PROBLEM SELECTOR PROBLEM 4
Osteoporosis, unspecified osteoporosis type, unspecified pathological fracture presence
Osteoporosis, unspecified osteoporosis type, unspecified pathological fracture presence

## 2018-10-10 NOTE — DISCHARGE NOTE ADULT - PLAN OF CARE
complete antibiotic course and follow up with primary doctor You were treated with antibiotics for a urinary tract infection. You should complete your entire course of antibiotics, do not save any tablets for the future. You may experience some side effects such as GI discomfort/indigestion, nausea/vomiting or diarrhea. If these side effects are disabling, you should go to your nearest ED. If you experience recurrent fevers, abdominal pain, blood in your urine, you should go to your nearest ED. You should also follow up with your primary care doctor within 10 days of discharge. Follow up with geriatric psychiatry You were evaluated by the inpatient psychiatry team, who deemed you appropriate for discharge to home with instructions to establish care with a geriatric psychiatrist at [facility] psychiatric clinic:     [phone] Continue proton pump inhibitor medication Your acid reflux will be managed with daily proton pump inhibitor medication. You are advised to avoid laying down within 2 hours of eating, and should avoid excessive coffee, tea, and alcohol. You should also follow up with your primary care doctor within 10 days of discharge. Start vitamin D You should take daily vitamin D to help decrease your risk of future falls and fractures, and should have your vitamin D levels monitored by your primary care doctor.

## 2018-10-10 NOTE — PROGRESS NOTE ADULT - PROBLEM SELECTOR PLAN 2
Improved after fluid administration  - encourage po intake  - continue monitoring BMP
Improved after fluid administration  - encourage po intake  - continue monitoring BMP

## 2019-08-22 ENCOUNTER — INPATIENT (INPATIENT)
Facility: HOSPITAL | Age: 84
LOS: 5 days | Discharge: HOME | DRG: 871 | End: 2019-08-28
Attending: HOSPITALIST | Admitting: HOSPITALIST
Payer: MEDICARE

## 2019-08-22 VITALS
OXYGEN SATURATION: 96 % | DIASTOLIC BLOOD PRESSURE: 99 MMHG | WEIGHT: 149.91 LBS | HEIGHT: 61 IN | SYSTOLIC BLOOD PRESSURE: 173 MMHG | HEART RATE: 88 BPM | TEMPERATURE: 99 F | RESPIRATION RATE: 18 BRPM

## 2019-08-22 DIAGNOSIS — Z98.89 OTHER SPECIFIED POSTPROCEDURAL STATES: Chronic | ICD-10-CM

## 2019-08-22 DIAGNOSIS — Z96.60 PRESENCE OF UNSPECIFIED ORTHOPEDIC JOINT IMPLANT: Chronic | ICD-10-CM

## 2019-08-22 DIAGNOSIS — Z90.710 ACQUIRED ABSENCE OF BOTH CERVIX AND UTERUS: Chronic | ICD-10-CM

## 2019-08-22 LAB
ALBUMIN SERPL ELPH-MCNC: 4 G/DL — SIGNIFICANT CHANGE UP (ref 3.3–5)
ALP SERPL-CCNC: 99 U/L — SIGNIFICANT CHANGE UP (ref 40–120)
ALT FLD-CCNC: 22 U/L — SIGNIFICANT CHANGE UP (ref 10–45)
ANION GAP SERPL CALC-SCNC: 11 MMOL/L — SIGNIFICANT CHANGE UP (ref 5–17)
AST SERPL-CCNC: 30 U/L — SIGNIFICANT CHANGE UP (ref 10–40)
BASOPHILS # BLD AUTO: 0 K/UL — SIGNIFICANT CHANGE UP (ref 0–0.2)
BASOPHILS NFR BLD AUTO: 0.4 % — SIGNIFICANT CHANGE UP (ref 0–2)
BILIRUB SERPL-MCNC: 0.8 MG/DL — SIGNIFICANT CHANGE UP (ref 0.2–1.2)
BUN SERPL-MCNC: 13 MG/DL — SIGNIFICANT CHANGE UP (ref 7–23)
CALCIUM SERPL-MCNC: 9.7 MG/DL — SIGNIFICANT CHANGE UP (ref 8.4–10.5)
CHLORIDE SERPL-SCNC: 101 MMOL/L — SIGNIFICANT CHANGE UP (ref 96–108)
CO2 SERPL-SCNC: 25 MMOL/L — SIGNIFICANT CHANGE UP (ref 22–31)
CREAT SERPL-MCNC: 0.86 MG/DL — SIGNIFICANT CHANGE UP (ref 0.5–1.3)
EOSINOPHIL # BLD AUTO: 0.1 K/UL — SIGNIFICANT CHANGE UP (ref 0–0.5)
EOSINOPHIL NFR BLD AUTO: 1 % — SIGNIFICANT CHANGE UP (ref 0–6)
GAS PNL BLDV: SIGNIFICANT CHANGE UP
GLUCOSE SERPL-MCNC: 135 MG/DL — HIGH (ref 70–99)
HCT VFR BLD CALC: 45.8 % — HIGH (ref 34.5–45)
HGB BLD-MCNC: 14.6 G/DL — SIGNIFICANT CHANGE UP (ref 11.5–15.5)
LYMPHOCYTES # BLD AUTO: 1.8 K/UL — SIGNIFICANT CHANGE UP (ref 1–3.3)
LYMPHOCYTES # BLD AUTO: 16.7 % — SIGNIFICANT CHANGE UP (ref 13–44)
MCHC RBC-ENTMCNC: 31.9 GM/DL — LOW (ref 32–36)
MCHC RBC-ENTMCNC: 32.4 PG — SIGNIFICANT CHANGE UP (ref 27–34)
MCV RBC AUTO: 102 FL — HIGH (ref 80–100)
MONOCYTES # BLD AUTO: 1 K/UL — HIGH (ref 0–0.9)
MONOCYTES NFR BLD AUTO: 8.6 % — SIGNIFICANT CHANGE UP (ref 2–14)
NEUTROPHILS # BLD AUTO: 8.1 K/UL — HIGH (ref 1.8–7.4)
NEUTROPHILS NFR BLD AUTO: 73.3 % — SIGNIFICANT CHANGE UP (ref 43–77)
PLATELET # BLD AUTO: 270 K/UL — SIGNIFICANT CHANGE UP (ref 150–400)
POTASSIUM SERPL-MCNC: 4.2 MMOL/L — SIGNIFICANT CHANGE UP (ref 3.5–5.3)
POTASSIUM SERPL-SCNC: 4.2 MMOL/L — SIGNIFICANT CHANGE UP (ref 3.5–5.3)
PROT SERPL-MCNC: 7.2 G/DL — SIGNIFICANT CHANGE UP (ref 6–8.3)
RBC # BLD: 4.51 M/UL — SIGNIFICANT CHANGE UP (ref 3.8–5.2)
RBC # FLD: 13.1 % — SIGNIFICANT CHANGE UP (ref 10.3–14.5)
SODIUM SERPL-SCNC: 137 MMOL/L — SIGNIFICANT CHANGE UP (ref 135–145)
WBC # BLD: 11.1 K/UL — HIGH (ref 3.8–10.5)
WBC # FLD AUTO: 11.1 K/UL — HIGH (ref 3.8–10.5)

## 2019-08-22 PROCEDURE — 99285 EMERGENCY DEPT VISIT HI MDM: CPT

## 2019-08-22 RX ORDER — SODIUM CHLORIDE 9 MG/ML
1000 INJECTION, SOLUTION INTRAVENOUS ONCE
Refills: 0 | Status: COMPLETED | OUTPATIENT
Start: 2019-08-22 | End: 2019-08-22

## 2019-08-22 RX ORDER — SODIUM CHLORIDE 9 MG/ML
1000 INJECTION, SOLUTION INTRAVENOUS ONCE
Refills: 0 | Status: DISCONTINUED | OUTPATIENT
Start: 2019-08-22 | End: 2019-08-22

## 2019-08-22 RX ADMIN — SODIUM CHLORIDE 500 MILLILITER(S): 9 INJECTION, SOLUTION INTRAVENOUS at 23:33

## 2019-08-22 NOTE — ED PROVIDER NOTE - PHYSICAL EXAMINATION
gen: well appearing  Mentation: Oriented to person and place.  psych: mood appropriate  ENT: airway patent  Eyes: conjunctivae clear bilaterally  Cardio: RRR, no m/r/g  Resp: normal BS b/l  GI: soft, non tender, non distended, no suprapubic tenderness. + Tenderness to both left and right flank.   Skin: No bruises, no rash VITAL SIGNS: I have reviewed nursing notes and confirm.  CONSTITUTIONAL: non-toxic, well appearing  SKIN: no rash, no petechiae.  EYES: PERRL, EOMI, pink conjunctiva, anicteric  ENT: tongue and uvular midline, no exudates, moist mucous membranes  NECK: Supple; no meningismus, no JVD  CARD: RRR, no murmurs, equal radial pulses bilaterally 2+  RESP: CTAB, no respiratory distress  ABD: Soft, non-tender, non-distended, no peritoneal signs, bilateral CVA tenderness  EXT: Normal ROM x4. No edema. No calf tenderness  NEURO: Alert. CN2-12 intact, equal strength bilaterally  PSYCH: Cooperative, appropriate.

## 2019-08-22 NOTE — ED PROVIDER NOTE - CLINICAL SUMMARY MEDICAL DECISION MAKING FREE TEXT BOX
89 YO female presenting with flank pain and AMS => concern for     1. UTI possibly c/w pyelonephritis => UA and culture, blood cultures, lactate, CBC, CMP 89 YO female with PMH of GERD, UTIs, TAHBSO, and multiple ankle and hip surgeries presenting with worsening confusion x 1 month with flank pain x 1 day.  Pt history of chronic UTIs with associated mental status change in the past that improved s/p abx.  Labs to r/o infectious or metabolic cause, anticipate likely admission.

## 2019-08-22 NOTE — ED PROVIDER NOTE - NS ED ROS FT
CONSTITUTIONAL: No fevers, no chills, no lightheadedness, no dizziness  Eyes: no visual changes  CV: No chest pain, no palpitations  PULM: No SOB, no cough  GI: No n/v/d, no abd pain  : no dysuria, no hematuria +INCREASED FREQUENCY +ODOR  SKIN: no rashes   NEURO: no headache, no focal weakness or numbness  PSYCHIATRIC: no known mental health issues.

## 2019-08-22 NOTE — ED PROVIDER NOTE - OBJECTIVE STATEMENT
89 YO female with PMH of GERD, UTIs, TAHBSO, and multiple ankle and hip surgeries presenting with flank pain and AMS. Due to confusion of patient, majority of history was obtained by daughter who is her caregiver and lives with her. Daughter notes that for the past 2 days, patient has had increased frequency and an odd odor. They then went to their PCP (Dr. Bhavin Gardner) who ordered a UA and blood cultures. Daughter could not obtain urine because patient threw it at her. Today, they were supposed to go get labs drawn, but patient refused to move and complained of lower back pain, causing daughter to call EMS. Daughter notes that for the past 2-3 weeks, patient has been confused, asking the same questions repetitively and not oriented to time. Patient denies fever/chills, abdominal pain, dysuria, hematuria, incontinence, trauma. No hx of MI, stroke, kidney stones. Patient was hospitalized at [facility] in October 2018 for UTI c/w pyelo + AMS.    Patient and daughter note that she does not have allergy to Bactrim.     Dr. Bhavin Gardner: [phone]

## 2019-08-22 NOTE — ED ADULT NURSE REASSESSMENT NOTE - NS ED NURSE REASSESS COMMENT FT1
Pt straight cathed for residual urine with TEJA Salas at bedside to confirm sterility. Approximately 350cc cloudy dark yellow urine drained. Pt tolerated procedure well.

## 2019-08-22 NOTE — ED PROVIDER NOTE - ATTENDING CONTRIBUTION TO CARE
89F, pmh GERD, s/p TAHBSO, multiple UTIs, presents with generalized confusion, worsening x 1 mo, with flank/low back pain over past few days. Also with increased urinary frequency over last couple of days. No reported f/c, n/v/d, cp, sob, abd pain. No hematuria though urine did appear a diff color. Pt had similar presentation last fall, was admitted for pyelonephritis at that time. family denies any falls or trauma.    PE: NAD, NCAT, MMM, Trachea midline, Normal conjunctiva, lungs CTAB, S1/S2 RRR, Normal perfusion, 2+ radial pulses bilat, Abdomen Soft, NTND, No rebound/guarding, No LE edema, No deformity of extremities, No rashes,  No focal motor or sensory deficits. No midline lumbar ttp. Mild bilat CVA ttp.    Ddx includes but not limited to pyelonephritis/cystitis, metabolic disturbance, anemia. To obtain labs, urine, anticipate admission. - Ed Clemente MD

## 2019-08-23 ENCOUNTER — TRANSCRIPTION ENCOUNTER (OUTPATIENT)
Age: 84
End: 2019-08-23

## 2019-08-23 DIAGNOSIS — F03.90 UNSPECIFIED DEMENTIA WITHOUT BEHAVIORAL DISTURBANCE: ICD-10-CM

## 2019-08-23 DIAGNOSIS — K21.9 GASTRO-ESOPHAGEAL REFLUX DISEASE WITHOUT ESOPHAGITIS: ICD-10-CM

## 2019-08-23 DIAGNOSIS — Z29.9 ENCOUNTER FOR PROPHYLACTIC MEASURES, UNSPECIFIED: ICD-10-CM

## 2019-08-23 DIAGNOSIS — E87.2 ACIDOSIS: ICD-10-CM

## 2019-08-23 DIAGNOSIS — R41.82 ALTERED MENTAL STATUS, UNSPECIFIED: ICD-10-CM

## 2019-08-23 DIAGNOSIS — D75.89 OTHER SPECIFIED DISEASES OF BLOOD AND BLOOD-FORMING ORGANS: ICD-10-CM

## 2019-08-23 DIAGNOSIS — N39.0 URINARY TRACT INFECTION, SITE NOT SPECIFIED: ICD-10-CM

## 2019-08-23 LAB
ANION GAP SERPL CALC-SCNC: 12 MMOL/L — SIGNIFICANT CHANGE UP (ref 5–17)
APPEARANCE UR: ABNORMAL
BACTERIA # UR AUTO: ABNORMAL
BASOPHILS # BLD AUTO: 0 K/UL — SIGNIFICANT CHANGE UP (ref 0–0.2)
BASOPHILS NFR BLD AUTO: 0.3 % — SIGNIFICANT CHANGE UP (ref 0–2)
BILIRUB UR-MCNC: NEGATIVE — SIGNIFICANT CHANGE UP
BUN SERPL-MCNC: 9 MG/DL — SIGNIFICANT CHANGE UP (ref 7–23)
CALCIUM SERPL-MCNC: 9.4 MG/DL — SIGNIFICANT CHANGE UP (ref 8.4–10.5)
CHLORIDE SERPL-SCNC: 99 MMOL/L — SIGNIFICANT CHANGE UP (ref 96–108)
CO2 SERPL-SCNC: 23 MMOL/L — SIGNIFICANT CHANGE UP (ref 22–31)
COLOR SPEC: YELLOW — SIGNIFICANT CHANGE UP
CREAT SERPL-MCNC: 0.67 MG/DL — SIGNIFICANT CHANGE UP (ref 0.5–1.3)
DIFF PNL FLD: ABNORMAL
EOSINOPHIL # BLD AUTO: 0.1 K/UL — SIGNIFICANT CHANGE UP (ref 0–0.5)
EOSINOPHIL NFR BLD AUTO: 1 % — SIGNIFICANT CHANGE UP (ref 0–6)
EPI CELLS # UR: 1 /HPF — SIGNIFICANT CHANGE UP
FOLATE SERPL-MCNC: 7.7 NG/ML — SIGNIFICANT CHANGE UP
GLUCOSE SERPL-MCNC: 144 MG/DL — HIGH (ref 70–99)
GLUCOSE UR QL: NEGATIVE — SIGNIFICANT CHANGE UP
HCT VFR BLD CALC: 47 % — HIGH (ref 34.5–45)
HGB BLD-MCNC: 14.9 G/DL — SIGNIFICANT CHANGE UP (ref 11.5–15.5)
HYALINE CASTS # UR AUTO: 1 /LPF — SIGNIFICANT CHANGE UP (ref 0–2)
KETONES UR-MCNC: SIGNIFICANT CHANGE UP
LACTATE SERPL-SCNC: 2.2 MMOL/L — HIGH (ref 0.7–2)
LEUKOCYTE ESTERASE UR-ACNC: ABNORMAL
LYMPHOCYTES # BLD AUTO: 1.7 K/UL — SIGNIFICANT CHANGE UP (ref 1–3.3)
LYMPHOCYTES # BLD AUTO: 13.9 % — SIGNIFICANT CHANGE UP (ref 13–44)
MAGNESIUM SERPL-MCNC: 1.8 MG/DL — SIGNIFICANT CHANGE UP (ref 1.6–2.6)
MCHC RBC-ENTMCNC: 31.8 GM/DL — LOW (ref 32–36)
MCHC RBC-ENTMCNC: 32.3 PG — SIGNIFICANT CHANGE UP (ref 27–34)
MCV RBC AUTO: 102 FL — HIGH (ref 80–100)
MONOCYTES # BLD AUTO: 0.8 K/UL — SIGNIFICANT CHANGE UP (ref 0–0.9)
MONOCYTES NFR BLD AUTO: 6.6 % — SIGNIFICANT CHANGE UP (ref 2–14)
NEUTROPHILS # BLD AUTO: 9.7 K/UL — HIGH (ref 1.8–7.4)
NEUTROPHILS NFR BLD AUTO: 78.2 % — HIGH (ref 43–77)
NITRITE UR-MCNC: NEGATIVE — SIGNIFICANT CHANGE UP
PH UR: 6 — SIGNIFICANT CHANGE UP (ref 5–8)
PHOSPHATE SERPL-MCNC: 2.2 MG/DL — LOW (ref 2.5–4.5)
PLATELET # BLD AUTO: 279 K/UL — SIGNIFICANT CHANGE UP (ref 150–400)
POTASSIUM SERPL-MCNC: 3.9 MMOL/L — SIGNIFICANT CHANGE UP (ref 3.5–5.3)
POTASSIUM SERPL-SCNC: 3.9 MMOL/L — SIGNIFICANT CHANGE UP (ref 3.5–5.3)
PROT UR-MCNC: ABNORMAL
RBC # BLD: 4.62 M/UL — SIGNIFICANT CHANGE UP (ref 3.8–5.2)
RBC # FLD: 12.9 % — SIGNIFICANT CHANGE UP (ref 10.3–14.5)
RBC CASTS # UR COMP ASSIST: 18 /HPF — HIGH (ref 0–4)
SODIUM SERPL-SCNC: 134 MMOL/L — LOW (ref 135–145)
SP GR SPEC: 1.04 — HIGH (ref 1.01–1.02)
UROBILINOGEN FLD QL: ABNORMAL
VIT B12 SERPL-MCNC: <150 PG/ML — LOW (ref 232–1245)
WBC # BLD: 12.4 K/UL — HIGH (ref 3.8–10.5)
WBC # FLD AUTO: 12.4 K/UL — HIGH (ref 3.8–10.5)
WBC UR QL: 267 /HPF — HIGH (ref 0–5)

## 2019-08-23 PROCEDURE — 99223 1ST HOSP IP/OBS HIGH 75: CPT | Mod: GC

## 2019-08-23 PROCEDURE — 12345: CPT | Mod: NC

## 2019-08-23 PROCEDURE — 93010 ELECTROCARDIOGRAM REPORT: CPT

## 2019-08-23 PROCEDURE — 70450 CT HEAD/BRAIN W/O DYE: CPT | Mod: 26

## 2019-08-23 RX ORDER — ESOMEPRAZOLE MAGNESIUM 40 MG/1
1 CAPSULE, DELAYED RELEASE ORAL
Qty: 0 | Refills: 0 | DISCHARGE

## 2019-08-23 RX ORDER — HALOPERIDOL DECANOATE 100 MG/ML
1 INJECTION INTRAMUSCULAR EVERY 6 HOURS
Refills: 0 | Status: DISCONTINUED | OUTPATIENT
Start: 2019-08-23 | End: 2019-08-25

## 2019-08-23 RX ORDER — CEFTRIAXONE 500 MG/1
1000 INJECTION, POWDER, FOR SOLUTION INTRAMUSCULAR; INTRAVENOUS EVERY 24 HOURS
Refills: 0 | Status: COMPLETED | OUTPATIENT
Start: 2019-08-23 | End: 2019-08-25

## 2019-08-23 RX ORDER — SENNA PLUS 8.6 MG/1
2 TABLET ORAL AT BEDTIME
Refills: 0 | Status: DISCONTINUED | OUTPATIENT
Start: 2019-08-23 | End: 2019-08-28

## 2019-08-23 RX ORDER — HEPARIN SODIUM 5000 [USP'U]/ML
5000 INJECTION INTRAVENOUS; SUBCUTANEOUS EVERY 8 HOURS
Refills: 0 | Status: DISCONTINUED | OUTPATIENT
Start: 2019-08-23 | End: 2019-08-28

## 2019-08-23 RX ORDER — SODIUM CHLORIDE 9 MG/ML
1000 INJECTION, SOLUTION INTRAVENOUS
Refills: 0 | Status: DISCONTINUED | OUTPATIENT
Start: 2019-08-23 | End: 2019-08-24

## 2019-08-23 RX ORDER — ACETAMINOPHEN 500 MG
975 TABLET ORAL ONCE
Refills: 0 | Status: COMPLETED | OUTPATIENT
Start: 2019-08-23 | End: 2019-08-23

## 2019-08-23 RX ORDER — ACETAMINOPHEN 500 MG
650 TABLET ORAL EVERY 6 HOURS
Refills: 0 | Status: DISCONTINUED | OUTPATIENT
Start: 2019-08-23 | End: 2019-08-28

## 2019-08-23 RX ORDER — CEFTRIAXONE 500 MG/1
1000 INJECTION, POWDER, FOR SOLUTION INTRAMUSCULAR; INTRAVENOUS ONCE
Refills: 0 | Status: COMPLETED | OUTPATIENT
Start: 2019-08-23 | End: 2019-08-23

## 2019-08-23 RX ORDER — PREGABALIN 225 MG/1
1000 CAPSULE ORAL DAILY
Refills: 0 | Status: COMPLETED | OUTPATIENT
Start: 2019-08-23 | End: 2019-08-26

## 2019-08-23 RX ADMIN — CEFTRIAXONE 100 MILLIGRAM(S): 500 INJECTION, POWDER, FOR SOLUTION INTRAMUSCULAR; INTRAVENOUS at 01:37

## 2019-08-23 RX ADMIN — HEPARIN SODIUM 5000 UNIT(S): 5000 INJECTION INTRAVENOUS; SUBCUTANEOUS at 21:23

## 2019-08-23 RX ADMIN — Medication 975 MILLIGRAM(S): at 00:36

## 2019-08-23 RX ADMIN — PREGABALIN 1000 MICROGRAM(S): 225 CAPSULE ORAL at 16:55

## 2019-08-23 RX ADMIN — HALOPERIDOL DECANOATE 1 MILLIGRAM(S): 100 INJECTION INTRAMUSCULAR at 11:26

## 2019-08-23 RX ADMIN — HEPARIN SODIUM 5000 UNIT(S): 5000 INJECTION INTRAVENOUS; SUBCUTANEOUS at 15:03

## 2019-08-23 RX ADMIN — SODIUM CHLORIDE 60 MILLILITER(S): 9 INJECTION, SOLUTION INTRAVENOUS at 16:51

## 2019-08-23 NOTE — H&P ADULT - PROBLEM SELECTOR PLAN 5
- DVT ppx: HSQ  - Diet: regular diet - patient on nexium at home  - continue esomeprazole while inpatient - patient with some cognitive impairment  - continue to monitor

## 2019-08-23 NOTE — ED ADULT NURSE REASSESSMENT NOTE - [facility] ED NURSE REASSESS COMMENT FT1
Pt resting comfortably in bed. States pain has improved slightly. Is currently waiting for admission. Daughter still at bedside.

## 2019-08-23 NOTE — H&P ADULT - ASSESSMENT
89 F with PMH of dementia, GERD, UTI, osteoporosis, multiple ankle and hip surgeries presenting with increased urinary frequency, back pain and altered mental status concerning for UTI

## 2019-08-23 NOTE — DISCHARGE NOTE PROVIDER - HOSPITAL COURSE
89 F with PMH of dementia, GERD, UTI, osteoporosis, multiple ankle and hip surgeries presenting with increased urinary frequency, back pain and altered mental status concerning for UTI 89 F with PMH of dementia, GERD, UTI, osteoporosis, multiple ankle and hip surgeries presenting with increased urinary frequency, back pain and altered mental status found to have TRINA and SIRS - likely secondary to UTI and colitis, WBC now trending down, ESR wnl for age, clinically improving, s/p 3 days of ceftriaxone    SIRS (systemic inflammatory response syndrome).  Plan: - pt with leukocytosis, tachycardia     - blood cx ngtd    - ua positive however cx with normal lai     - s/p 3 days of IV ceftriaxone    - CT chest negative for infectious etiology. CT A/P with mild thickening of sigmoid colon ? colitis however abdominal exam benign, no diarrhea - patient has been constipated, may be stercoral colitis - bowel regimen; had BM.    - Given that leukocytosis is downtrending, tachycardia improved, afebrile, monitor off further abx    -ESR wnl for age, CRP elevated, however non-toxic, unclear meaning at this time.     UTI (urinary tract infection).  patient with increased urinary frequency, suprapubic tenderness, mild leukocytosis, elevated lactate on admission     - positive UA concerning for UTI though urine cx with normal lai     - s/p 3 days of Ceftriaxone (cultures from 06/18 grew E.coli sensitive to ceftriaxone)    - blood cx ngtd    Patient no longer complaining of symptoms, will observe off antibiotics.     Constipation.  c/w colace/senna, add dulcolax PO bid    CT with possible colitis ?stercoral.     Monitor for BM. had BM.    Urinary retention.   Likely due to anticholinergic, constipation, UTI    c/w manrique, proceed with TOV once has BM and more mobile.     TRINA (acute kidney injury).  likely due to urinary retention due to anticholinergics, UTI, constipation     Cr back down to normal     - manrique now in, continue for now until BM and patient ambulating.    - avoid nephrotoxic agents.    Lactic acidosis. Plan: mild lactic acidosis     - s/p  1L NS    - improved.     Macrocytosis without anemia.   patient with increased MCV of 102    - b12 <150, c/w b12 1000mcg. 89 F with PMH of dementia, GERD, UTI, osteoporosis, multiple ankle and hip surgeries presenting with increased urinary frequency, back pain and altered mental status found to have TRINA and SIRS - likely secondary to UTI and colitis, WBC now trending down, ESR wnl for age, clinically improving, s/p 3 days of ceftriaxone    SIRS (systemic inflammatory response syndrome).  Plan: - pt with leukocytosis, tachycardia     - blood cx ngtd    - ua positive however cx with normal lai     - s/p 3 days of IV ceftriaxone    - CT chest negative for infectious etiology. CT A/P with mild thickening of sigmoid colon ? colitis however abdominal exam benign, no diarrhea - patient has been constipated, may be stercoral colitis - bowel regimen; had BM.    - Given that leukocytosis is downtrending, tachycardia improved, afebrile, monitor off further abx    -ESR wnl for age, CRP elevated, however non-toxic, unclear meaning at this time.     UTI (urinary tract infection).  patient with increased urinary frequency, suprapubic tenderness, mild leukocytosis, elevated lactate on admission     - positive UA concerning for UTI though urine cx with normal lai     - s/p 3 days of Ceftriaxone (cultures from 06/18 grew E.coli sensitive to ceftriaxone)    - blood cx ngtd    Patient no longer complaining of symptoms, will observe off antibiotics.     Constipation.  c/w colace/senna, add dulcolax PO bid    CT with possible colitis ?stercoral.     Monitor for BM. had BM.    Urinary retention.   Likely due to anticholinergic, constipation, UTI    c/w manrique, proceed with TOV when more mobile.     TRINA (acute kidney injury).  likely due to urinary retention due to anticholinergics, UTI, constipation     Cr back down to normal     - avoid nephrotoxic agents.    Lactic acidosis.  mild lactic acidosis     - s/p  1L NS    - improved.     Macrocytosis without anemia.   patient with increased MCV of 102    - b12 <150, c/w b12 1000mcg.     seen by PT and advised rehab.    cleared by MD for discharge rehab. 89 F with PMH of dementia, GERD, UTI, osteoporosis, multiple ankle and hip surgeries presenting with increased urinary frequency, back pain and altered mental status found to have TRINA and SIRS - likely secondary to UTI and colitis, WBC now trending down, ESR wnl for age, clinically improving, s/p 3 days of ceftriaxone    SIRS (systemic inflammatory response syndrome).  Plan: - pt with leukocytosis, tachycardia     - blood cx ngtd    - ua positive however cx with normal lai     - s/p 3 days of IV ceftriaxone    - CT chest negative for infectious etiology. CT A/P with mild thickening of sigmoid colon ? colitis however abdominal exam benign, no diarrhea - patient has been constipated, may be stercoral colitis - bowel regimen; had BM.    - Given that leukocytosis is downtrending, tachycardia improved, afebrile, monitor off further abx    -ESR wnl for age, CRP elevated, however non-toxic, unclear meaning at this time.     UTI (urinary tract infection).  patient with increased urinary frequency, suprapubic tenderness, mild leukocytosis, elevated lactate on admission     - positive UA concerning for UTI though urine cx with normal lai     - s/p 3 days of Ceftriaxone (cultures from 06/18 grew E.coli sensitive to ceftriaxone)    - blood cx ngtd    Patient no longer complaining of symptoms, will observe off antibiotics.     Constipation.  c/w colace/senna, add dulcolax PO bid    CT with possible colitis ?stercoral.     Monitor for BM. had BM.    Urinary retention.   Likely due to anticholinergic, constipation, UTI    c/w manrique, failed trial TOV; replaced manrique cath. proceed with TOV when more mobile.     TRINA (acute kidney injury).  likely due to urinary retention due to anticholinergics, UTI, constipation     Cr back down to normal     - avoid nephrotoxic agents.    Lactic acidosis.  mild lactic acidosis     - s/p  1L NS    - improved.     Macrocytosis without anemia.   patient with increased MCV of 102    - b12 <150, c/w b12 1000mcg.     seen by PT and advised rehab.    cleared by MD for discharge rehab. 89 F with PMH of dementia, GERD, UTI, osteoporosis, multiple ankle and hip surgeries presenting with increased urinary frequency, back pain and altered mental status found to have TRINA and SIRS (tachycardia, leukocytosis) likely secondary to UTI and stercoral colitis. CT A/P with mild thickening of sigmoid colon, CT chest negative    Patient treated with ceftriaxone for UTI (UA positive in setting of AMS and dementia), and aggressive bowel regimen. Patient had multiple BMs, MS returned to baseline. Treated with ceftriaxone x3day.     -ESR wnl for age, CRP elevated, however non-toxic, unclear meaning at this time.     - blood cx ngtd    Hospital course complicated by behavioral disturbance likely secondary to metabolic encephalopathy in setting of dementia (given haldol 1mg x1). In addition, developed urinary retention, likely chronic, manrique inserted, patient failed TOV. Would favor another trial once more ambulatory. [place] also with TRINA likely due to retention which resolved after manrique placement. Patient discharged with manrique catheter to [facility]     Macrocytosis without anemia.   patient with increased MCV of 102    - b12 <150, c/w b12 1000mcg.     seen by PT and advised rehab.    cleared by MD for discharge rehab. 89 F with PMH of dementia, GERD, UTI, osteoporosis, multiple ankle and hip surgeries presenting with increased urinary frequency, back pain and altered mental status found to have TRINA and SIRS (tachycardia, leukocytosis) likely secondary to UTI and stercoral colitis. CT A/P with mild thickening of sigmoid colon, CT chest negative    Patient treated with ceftriaxone for UTI (UA positive in setting of AMS and dementia), and aggressive bowel regimen. Patient had multiple BMs, MS returned to baseline. Treated with ceftriaxone x3day.     -ESR wnl for age, CRP elevated, however non-toxic, unclear meaning at this time.     - blood cx ngtd    Hospital course complicated by behavioral disturbance likely secondary to metabolic encephalopathy in setting of dementia (given haldol 1mg x1). In addition, developed urinary retention, likely chronic, manrique inserted, patient failed TOV. Would favor another trial once more ambulatory. [place] also with TRINA likely due to retention which resolved after manrique placement. Patient discharged with manrique catheter to [facility]     Macrocytosis without anemia.   patient with increased MCV of 102    - b12 <150, c/w b12 1000mcg.     seen by PT and advised rehab.    cleared by MD for discharge rehab.             9/17 Addendum: 89 F with PMH of dementia, GERD, UTI, osteoporosis, multiple ankle and hip surgeries presenting p/w increased urinary frequency, back pain and metabolic encephalopathy a/w with sepsis (POA), TRINA likely prerenal azotemia and metabolic encephalopathy secondary to UTI and stercoral colitis.

## 2019-08-23 NOTE — H&P ADULT - NSHPREVIEWOFSYSTEMS_GEN_ALL_CORE
REVIEW OF SYSTEMS:    CONSTITUTIONAL: no fevers or chills  EYES/ENT: No visual changes;  No vertigo or throat pain   NECK: No pain or stiffness  RESPIRATORY: No cough, wheezing, hemoptysis; No shortness of breath  CARDIOVASCULAR: No chest pain or palpitations  GASTROINTESTINAL: +_ abdominal pain; no nausea, vomiting; no diarrhea or constipation. No hematemesis, melena or hematochezia.  GENITOURINARY: no dysuria, increased frequency, no hematuria  NEUROLOGICAL: No numbness or weakness  SKIN: No itching, burning, rashes, or lesions   MUSCULO: back pain  All other review of systems is negative unless indicated above. REVIEW OF SYSTEMS:    CONSTITUTIONAL: no fevers or chills  EYES/ENT: No visual changes;  No vertigo or throat pain   NECK: No pain or stiffness  RESPIRATORY: No cough, wheezing, hemoptysis; No shortness of breath  CARDIOVASCULAR: No chest pain or palpitations  GASTROINTESTINAL: + abdominal pain; no nausea, vomiting; no diarrhea or constipation. No hematemesis, melena or hematochezia.  GENITOURINARY: no dysuria, increased frequency, no hematuria  NEUROLOGICAL: No numbness or weakness  SKIN: No itching, burning, rashes, or lesions   MUSCULO: back pain  All other review of systems is negative unless indicated above.

## 2019-08-23 NOTE — H&P ADULT - PROBLEM SELECTOR PROBLEM 5
Preventive measure GERD (gastroesophageal reflux disease) Dementia without behavioral disturbance, unspecified dementia type

## 2019-08-23 NOTE — ED ADULT NURSE NOTE - NSIMPLEMENTINTERV_GEN_ALL_ED
Implemented All Fall with Harm Risk Interventions:  [place] to call system. Call bell, personal items and telephone within reach. Instruct patient to call for assistance. Room bathroom lighting operational. Non-slip footwear when patient is off stretcher. Physically safe environment: no spills, clutter or unnecessary equipment. Stretcher in lowest position, wheels locked, appropriate side rails in place. Provide visual cue, wrist band, yellow gown, etc. Monitor gait and stability. Monitor for mental status changes and reorient to person, place, and time. Review medications for side effects contributing to fall risk. Reinforce activity limits and safety measures with patient and family. Provide visual clues: red socks.

## 2019-08-23 NOTE — PROGRESS NOTE ADULT - PROBLEM SELECTOR PLAN 5
- patient with some cognitive impairment  - now with superimposed delirium  - continue to monitor  - haldol prn as above for agitation  - fall precautions

## 2019-08-23 NOTE — H&P ADULT - PROBLEM SELECTOR PROBLEM 3
Dementia without behavioral disturbance, unspecified dementia type Macrocytosis without anemia Lactic acidosis

## 2019-08-23 NOTE — H&P ADULT - NSHPLABSRESULTS_GEN_ALL_CORE
CBC Full  -  ( 22 Aug 2019 22:23 )  WBC Count : 11.1 K/uL  Hemoglobin : 14.6 g/dL  Hematocrit : 45.8 %  Platelet Count - Automated : 270 K/uL  Mean Cell Volume : 102.0 fl  Mean Cell Hemoglobin : 32.4 pg  Mean Cell Hemoglobin Concentration : 31.9 gm/dL  Auto Neutrophil # : 8.1 K/uL  Auto Lymphocyte # : 1.8 K/uL  Auto Monocyte # : 1.0 K/uL  Auto Eosinophil # : 0.1 K/uL  Auto Basophil # : 0.0 K/uL  Auto Neutrophil % : 73.3 %  Auto Lymphocyte % : 16.7 %  Auto Monocyte % : 8.6 %  Auto Eosinophil % : 1.0 %  Auto Basophil % : 0.4 %        137  |  101  |  13  ----------------------------<  135<H>  4.2   |  25  |  0.86    Ca    9.7      22 Aug 2019 22:23    TPro  7.2  /  Alb  4.0  /  TBili  0.8  /  DBili  x   /  AST  30  /  ALT  22  /  AlkPhos  99  08    LIVER FUNCTIONS - ( 22 Aug 2019 22:23 )  Alb: 4.0 g/dL / Pro: 7.2 g/dL / ALK PHOS: 99 U/L / ALT: 22 U/L / AST: 30 U/L / GGT: x           Urinalysis Basic - ( 22 Aug 2019 23:32 )    Color: Yellow / Appearance: Slightly Turbid / S.037 / pH: x  Gluc: x / Ketone: Trace  / Bili: Negative / Urobili: 4 mg/dL   Blood: x / Protein: 100 mg/dL / Nitrite: Negative   Leuk Esterase: Large / RBC: 18 /hpf /  /HPF   Sq Epi: x / Non Sq Epi: 1 /hpf / Bacteria: TNTC CBC Full  -  ( 22 Aug 2019 22:23 )  WBC Count : 11.1 K/uL  Hemoglobin : 14.6 g/dL  Hematocrit : 45.8 %  Platelet Count - Automated : 270 K/uL  Mean Cell Volume : 102.0 fl  Mean Cell Hemoglobin : 32.4 pg  Mean Cell Hemoglobin Concentration : 31.9 gm/dL  Auto Neutrophil # : 8.1 K/uL  Auto Lymphocyte # : 1.8 K/uL  Auto Monocyte # : 1.0 K/uL  Auto Eosinophil # : 0.1 K/uL  Auto Basophil # : 0.0 K/uL  Auto Neutrophil % : 73.3 %  Auto Lymphocyte % : 16.7 %  Auto Monocyte % : 8.6 %  Auto Eosinophil % : 1.0 %  Auto Basophil % : 0.4 %        137  |  101  |  13  ----------------------------<  135<H>  4.2   |  25  |  0.86    Ca    9.7      22 Aug 2019 22:23    TPro  7.2  /  Alb  4.0  /  TBili  0.8  /  DBili  x   /  AST  30  /  ALT  22  /  AlkPhos  99  08    LIVER FUNCTIONS - ( 22 Aug 2019 22:23 )  Alb: 4.0 g/dL / Pro: 7.2 g/dL / ALK PHOS: 99 U/L / ALT: 22 U/L / AST: 30 U/L / GGT: x           Urinalysis Basic - ( 22 Aug 2019 23:32 )    Color: Yellow / Appearance: Slightly Turbid / S.037 / pH: x  Gluc: x / Ketone: Trace  / Bili: Negative / Urobili: 4 mg/dL   Blood: x / Protein: 100 mg/dL / Nitrite: Negative   Leuk Esterase: Large / RBC: 18 /hpf /  /HPF   Sq Epi: x / Non Sq Epi: 1 /hpf / Bacteria: TNTC  EKG: ordered  Head CT: ordered

## 2019-08-23 NOTE — PHYSICAL THERAPY INITIAL EVALUATION ADULT - PERTINENT HX OF CURRENT PROBLEM, REHAB EVAL
Pt is a 89 F with PMH of dementia, GERD, UTI, osteoporosis, multiple ankle and hip surgeries presenting with increased urinary frequency, back pain and altered mental status concerning for UTI. Pt started on Ceftriaxone, pending UA and blood cultures.

## 2019-08-23 NOTE — H&P ADULT - PROBLEM SELECTOR PLAN 4
- patient on nexium at home  - continue esomeprazole while inpatient - patient with some cognitive impairment  - continue to monitor - patient with increased MCV of 102  - check B12, folate given recent decline in mental status

## 2019-08-23 NOTE — PHYSICAL THERAPY INITIAL EVALUATION ADULT - GAIT DEVIATIONS NOTED, PT EVAL
decreased step length/decreased stride length/decreased weight-shifting ability/crouch, shuffling/decreased [place]

## 2019-08-23 NOTE — DISCHARGE NOTE PROVIDER - NSDCCPCAREPLAN_GEN_ALL_CORE_FT
PRINCIPAL DISCHARGE DIAGNOSIS  Diagnosis: UTI (urinary tract infection)  Assessment and Plan of Treatment: treatment with antibiotics      SECONDARY DISCHARGE DIAGNOSES  Diagnosis: Lactic acidosis  Assessment and Plan of Treatment: Lactic acidosis    Diagnosis: Macrocytosis without anemia  Assessment and Plan of Treatment: Macrocytosis without anemia    Diagnosis: Flank pain  Assessment and Plan of Treatment: PRINCIPAL DISCHARGE DIAGNOSIS  Diagnosis: UTI (urinary tract infection)  Assessment and Plan of Treatment: treatment with antibiotics completed      SECONDARY DISCHARGE DIAGNOSES  Diagnosis: Lactic acidosis  Assessment and Plan of Treatment: Lactic acidosis-stable    Diagnosis: Macrocytosis without anemia  Assessment and Plan of Treatment: Macrocytosis without anemia    Diagnosis: Dementia without behavioral disturbance, unspecified dementia type  Assessment and Plan of Treatment: Dementia without behavioral disturbance, unspecified dementia type  fall precaution  assistance in ADls    Diagnosis: Flank pain  Assessment and Plan of Treatment: PRINCIPAL DISCHARGE DIAGNOSIS  Diagnosis: UTI (urinary tract infection)  Assessment and Plan of Treatment: treatment with antibiotics completed      SECONDARY DISCHARGE DIAGNOSES  Diagnosis: Urinary retention  Assessment and Plan of Treatment: failed voiding trial; manrique cath for now; trial of voiding when more ambulatory at rehab.    Diagnosis: Lactic acidosis  Assessment and Plan of Treatment: Lactic acidosis-stable    Diagnosis: Macrocytosis without anemia  Assessment and Plan of Treatment: Macrocytosis without anemia    Diagnosis: Dementia without behavioral disturbance, unspecified dementia type  Assessment and Plan of Treatment: Dementia without behavioral disturbance, unspecified dementia type  fall precaution  assistance in ADls    Diagnosis: Flank pain  Assessment and Plan of Treatment:

## 2019-08-23 NOTE — ED ADULT NURSE NOTE - OBJECTIVE STATEMENT
89 y/o female PMH dementia, UTI, GERD, spinal stenosis, osteoporosis presents to ED from home c/o lower back pain x 3 days and AMS x 2-3 weeks. Pt lives with daughter who is nurse. Pt is a poor historian, majority of history given by her daughter. Daughter states that for the last 3 days, pt has been having urinary frequency with dark and foul smelling urine. Pt was supposed to have blood work and UA today though pt refused to go due to lower back pain. Daughter says she has been having difficulty moving pt due to this pain so she called EMS. Daughter also states that for last 2-3 weeks, pt has been more confused, asking repetitive questions, seems more disoriented (she previously threw urine sample at daughter). Pt was admitted in October 2018 for pyelo and AMS. Denies fever, chills, abdominal pain, pain with urination, injury to back, falls. Skin warm, dry, intact. Abdomen is soft, nondistended, nontender to palpation. Pt verbalizes pain when attempting to sit up or turn to the side. 20G IV placed in RAC. Safety and comfort provided. Daughter at bedside.

## 2019-08-23 NOTE — PHYSICAL THERAPY INITIAL EVALUATION ADULT - PRECAUTIONS/LIMITATIONS, REHAB EVAL
Reason For Visit  RONNY FAM CST    Electronically signed by : GRACIA BO NP; Feb 8 2017  3:13PM CST    
fall precautions

## 2019-08-23 NOTE — PHYSICAL THERAPY INITIAL EVALUATION ADULT - ADDITIONAL COMMENTS
PTA pt lived in pvt home with daughter and family, able to assist as needed, 5 steps to enter +HR, flight to bedroom up, to kitchen/living room down, has RW but is resistant to use at home, uses cane independently, has tub shower with shower chair and grab bars.

## 2019-08-23 NOTE — PHYSICAL THERAPY INITIAL EVALUATION ADULT - CRITERIA FOR SKILLED THERAPEUTIC INTERVENTIONS
functional limitations in following categories/impairments found/therapy frequency/anticipated discharge recommendation/rehab potential/predicted duration of therapy intervention/risk reduction/prevention

## 2019-08-23 NOTE — H&P ADULT - HISTORY OF PRESENT ILLNESS
89 F with PMH of GERD, TAHBSO, UTI, multiple ankle and hip surgeries presenting with flank pain and altered mental status. As per daughter, patient has been having increased urinary frequency as well as foul urine. Patient has also been confused for the past 2-3 weeks, asking repetitive questions, not oriented to time. Patient denies any fevers, chills, nausea, vomiting, abdominal pain.     Of note, patient was admitted in 2018 for similar symptoms, found to have UTI and discharged with antibiotics.     In the ED, patient was found to have T 98.6, HR 88, RR 18 satting 96% on room air, /99. Patient was given 975 tylenol, 1 L LR, ceftriaxone X1 89 F with PMH of dementia, GERD, TAHBSO, UTI, osteoporosis, multiple ankle and hip surgeries presenting with flank pain and altered mental status. Patient is poor historian, history obtained from daughter. As per daughter, patient has been having increased urinary frequency as well as foul urine. Today she complained of lower back pain to the daughter. As per daughter, patient has also been very confused for the past 2-3 weeks, asking repetitive questions, not oriented to time, not recognizing family members. Daughter says there is a quick decline in her mental status. Patient has also been unsteady on her feet and refuses to use a walker at home. Patient denies any fevers, chills, nausea, vomiting, abdominal pain. Of note, patient was admitted in 2018 for similar symptoms, found to have UTI and discharged with antibiotics.     In the ED, patient was found to have T 98.6, HR 88, RR 18 satting 96% on room air, /99. Patient was given 975 Tylenol 1 L LR, ceftriaxone X1 89 F with PMH of dementia, GERD, TAHBSO, UTI, osteoporosis, multiple ankle and hip surgeries presenting with lower back pain and altered mental status. Patient is poor historian, history obtained from daughter. As per daughter, patient has been having increased urinary frequency as well as foul urine. Today she complained of lower back pain to the daughter. As per daughter, patient has also been very confused for the past 2-3 weeks, asking repetitive questions, not oriented to time, not recognizing family members. Daughter says there is a quick decline in her mental status. Patient has also been unsteady on her feet and refuses to use a walker at home. Patient denies any fevers, chills, nausea, vomiting, abdominal pain. Of note, patient was admitted in 2018 for similar symptoms, found to have UTI and discharged with antibiotics.     In the ED, patient was found to have T 98.6, HR 88, RR 18 satting 96% on room air, /99. Patient was given 975 Tylenol 1 L LR, ceftriaxone X1 89 F with PMH of dementia, GERD, TAHBSO, UTI, osteoporosis, multiple ankle and hip surgeries presenting with lower back pain and altered mental status. Patient is poor historian, history obtained from daughter (Autumn Gautam). As per daughter, patient has been having increased urinary frequency as well as foul urine. Today she complained of lower back pain to the daughter. As per daughter, patient has also been very confused for the past 2-3 weeks, asking repetitive questions, not oriented to time, not recognizing family members. Daughter says there is a quick decline in her mental status. Patient has also been unsteady on her feet and refuses to use a walker at home. Patient denies any fevers, chills, nausea, vomiting, abdominal pain. Of note, patient was admitted in 2018 for similar symptoms, found to have UTI and discharged with antibiotics.     In the ED, patient was found to have T 98.6, HR 88, RR 18 satting 96% on room air, /99. Patient was given 975 Tylenol 1 L LR, ceftriaxone X1

## 2019-08-23 NOTE — H&P ADULT - PROBLEM SELECTOR PLAN 3
- patient with some cognitive impairment  - continue to monitor - patient with increased MCV of 102  - check B12, folate given recent decline in mental status mild lactic acidosis s/p ceftriaxone and 1L NS  - obtain repeat lactate to monitor for resolution  - patient encephalopathic however not toxic appearing

## 2019-08-23 NOTE — H&P ADULT - PROBLEM SELECTOR PLAN 2
- patient with altered mental status in the setting of infection vs intracranial pathology  - treat for UTI  - f/u CTH - patient with altered mental status in the setting of infection vs intracranial pathology. As per daughter, patient was found to have a meningioma on CTH incidentally in the past  - treat for UTI, monitor for change in mental status  - f/u CTH - patient with altered mental status in the setting of infection vs intracranial pathology. As per daughter, patient was found to have a meningioma on CTH incidentally in the past  - treat for UTI, monitor for change in mental status  - f/u CTH given mental status change >3-4wk

## 2019-08-23 NOTE — PROGRESS NOTE ADULT - PROBLEM SELECTOR PLAN 7
- DVT ppx: HSQ  - Diet: regular diet - DVT ppx: HSQ  - Diet: regular diet  - PT evaluation in progress.

## 2019-08-23 NOTE — PROGRESS NOTE ADULT - SUBJECTIVE AND OBJECTIVE BOX
Patient is a 89y old  Female who presents with a chief complaint of increased urinary frequency (23 Aug 2019 05:46)      SUBJECTIVE / OVERNIGHT EVENTS:    MEDICATIONS  (STANDING):  cefTRIAXone   IVPB 1000 milliGRAM(s) IV Intermittent every 24 hours  heparin  Injectable 5000 Unit(s) SubCutaneous every 8 hours    MEDICATIONS  (PRN):      Vital Signs Last 24 Hrs  T(C): 36.8 (23 Aug 2019 04:10), Max: 37 (22 Aug 2019 21:20)  T(F): 98.2 (23 Aug 2019 04:10), Max: 98.6 (22 Aug 2019 21:20)  HR: 78 (23 Aug 2019 04:10) (74 - 88)  BP: 161/89 (23 Aug 2019 04:10) (158/90 - 173/99)  BP(mean): --  RR: 18 (23 Aug 2019 04:10) (18 - 18)  SpO2: 96% (23 Aug 2019 04:10) (95% - 96%)  CAPILLARY BLOOD GLUCOSE        I&O's Summary    22 Aug 2019 07:01  -  23 Aug 2019 07:00  --------------------------------------------------------  IN: 0 mL / OUT: 100 mL / NET: -100 mL        PHYSICAL EXAM:  GENERAL: NAD, well-developed  HEAD:  Atraumatic, Normocephalic  EYES: EOMI, PERRLA, conjunctiva and sclera clear  NECK: Supple, No JVD  CHEST/LUNG: Clear to auscultation bilaterally; No wheeze  HEART: Regular rate and rhythm; No murmurs, rubs, or gallops  ABDOMEN: Soft, Nontender, Nondistended; Bowel sounds present  EXTREMITIES:  2+ Peripheral Pulses, No clubbing, cyanosis, or edema  PSYCH: AAOx3  NEUROLOGY: non-focal  SKIN: No rashes or lesions    LABS:                        14.9   12.4  )-----------( 279      ( 23 Aug 2019 07:50 )             47.0     08-23    134<L>  |  99  |  9   ----------------------------<  144<H>  3.9   |  23  |  0.67    Ca    9.4      23 Aug 2019 07:50  Phos  2.2     -  Mg     1.8         TPro  7.2  /  Alb  4.0  /  TBili  0.8  /  DBili  x   /  AST  30  /  ALT  22  /  AlkPhos  99            Urinalysis Basic - ( 22 Aug 2019 23:32 )    Color: Yellow / Appearance: Slightly Turbid / S.037 / pH: x  Gluc: x / Ketone: Trace  / Bili: Negative / Urobili: 4 mg/dL   Blood: x / Protein: 100 mg/dL / Nitrite: Negative   Leuk Esterase: Large / RBC: 18 /hpf /  /HPF   Sq Epi: x / Non Sq Epi: 1 /hpf / Bacteria: TNTC        RADIOLOGY & ADDITIONAL TESTS:    Imaging Personally Reviewed:    Consultant(s) Notes Reviewed:      Care Discussed with Consultants/Other Providers: Patient is a 89y old  Female who presents with a chief complaint of increased urinary frequency (23 Aug 2019 05:46)      SUBJECTIVE / OVERNIGHT EVENTS:  Pt seen and examined. No acute events overnight. She c/o lower abd pain. Denies fever/chills, n/v. Per RN pt noted to be agitated earlier this AM.    MEDICATIONS  (STANDING):  cefTRIAXone   IVPB 1000 milliGRAM(s) IV Intermittent every 24 hours  heparin  Injectable 5000 Unit(s) SubCutaneous every 8 hours    MEDICATIONS  (PRN):      Vital Signs Last 24 Hrs  T(C): 36.8 (23 Aug 2019 04:10), Max: 37 (22 Aug 2019 21:20)  T(F): 98.2 (23 Aug 2019 04:10), Max: 98.6 (22 Aug 2019 21:20)  HR: 78 (23 Aug 2019 04:10) (74 - 88)  BP: 161/89 (23 Aug 2019 04:10) (158/90 - 173/99)  BP(mean): --  RR: 18 (23 Aug 2019 04:10) (18 - 18)  SpO2: 96% (23 Aug 2019 04:10) (95% - 96%)  CAPILLARY BLOOD GLUCOSE        I&O's Summary    22 Aug 2019 07:01  -  23 Aug 2019 07:00  --------------------------------------------------------  IN: 0 mL / OUT: 100 mL / NET: -100 mL        PHYSICAL EXAM:  GENERAL: NAD, anicteric, afebrile  HEAD:  Atraumatic, Normocephalic  EYES: EOMI, PERRLA, conjunctiva and sclera clear  NECK: Supple, No JVD  CHEST/LUNG: Clear to auscultation bilaterally; No wheeze  HEART: Regular rate and rhythm; No murmurs, rubs, or gallops  ABDOMEN: Soft, Nontender, Nondistended; Bowel sounds present  EXTREMITIES:  2+ Peripheral Pulses, No clubbing, cyanosis, or edema  PSYCH: AAOx1  NEUROLOGY: non-focal  SKIN: No rashes or lesions    LABS:                        14.9   12.4  )-----------( 279      ( 23 Aug 2019 07:50 )             47.0     08-    134<L>  |  99  |  9   ----------------------------<  144<H>  3.9   |  23  |  0.67    Ca    9.4      23 Aug 2019 07:50  Phos  2.2       Mg     1.8         TPro  7.2  /  Alb  4.0  /  TBili  0.8  /  DBili  x   /  AST  30  /  ALT  22  /  AlkPhos  99            Urinalysis Basic - ( 22 Aug 2019 23:32 )    Color: Yellow / Appearance: Slightly Turbid / S.037 / pH: x  Gluc: x / Ketone: Trace  / Bili: Negative / Urobili: 4 mg/dL   Blood: x / Protein: 100 mg/dL / Nitrite: Negative   Leuk Esterase: Large / RBC: 18 /hpf /  /HPF   Sq Epi: x / Non Sq Epi: 1 /hpf / Bacteria: TNTC

## 2019-08-23 NOTE — H&P ADULT - NSHPPHYSICALEXAM_GEN_ALL_CORE
PHYSICAL EXAM:    Vital Signs Last 24 Hrs  T(C): 36.8 (23 Aug 2019 04:10), Max: 37 (22 Aug 2019 21:20)  T(F): 98.2 (23 Aug 2019 04:10), Max: 98.6 (22 Aug 2019 21:20)  HR: 78 (23 Aug 2019 04:10) (74 - 88)  BP: 161/89 (23 Aug 2019 04:10) (158/90 - 173/99)  BP(mean): --  RR: 18 (23 Aug 2019 04:10) (18 - 18)  SpO2: 96% (23 Aug 2019 04:10) (95% - 96%)    General: No acute distress.  HEENT: No scleral icterus or injection.   Neck: Supple.  No lymphadenopathy.   Heart: RRR.  Normal S1 and S2.  No murmurs, rubs, or gallops.   Lungs: CTAB. No wheezes, crackles, or rhonchi.    Abdomen: BS+, soft, suprapubic tenderness  Skin: Warm and dry.  No rashes.  Extremities: No edema, clubbing, or cyanosis.  2+ peripheral pulses b/l.  Musculoskeletal: No deformities.   Neuro: A&Ox1  Psych: patient not responding to questions appropriately PHYSICAL EXAM:    Vital Signs Last 24 Hrs  T(C): 36.8 (23 Aug 2019 04:10), Max: 37 (22 Aug 2019 21:20)  T(F): 98.2 (23 Aug 2019 04:10), Max: 98.6 (22 Aug 2019 21:20)  HR: 78 (23 Aug 2019 04:10) (74 - 88)  BP: 161/89 (23 Aug 2019 04:10) (158/90 - 173/99)  BP(mean): --  RR: 18 (23 Aug 2019 04:10) (18 - 18)  SpO2: 96% (23 Aug 2019 04:10) (95% - 96%)    General: No acute distress.  HEENT: No scleral icterus or injection.   Neck: Supple.  No lymphadenopathy.   Heart: RRR.  Normal S1 and S2.  No murmurs, rubs, or gallops.   Lungs: CTAB. No wheezes, crackles, or rhonchi.    Abdomen: BS+, soft, suprapubic tenderness  Back: no CVA tenderness   Skin: Warm and dry.  No rashes.  Extremities: No edema, clubbing, or cyanosis.  2+ peripheral pulses b/l.  Musculoskeletal: No deformities.   Neuro: A&Ox1  Psych: patient not responding to questions appropriately

## 2019-08-23 NOTE — H&P ADULT - NSICDXPASTSURGICALHX_GEN_ALL_CORE_FT
PAST SURGICAL HISTORY:  S/P hip replacement right    S/P ORIF (open reduction internal fixation) fracture L hip, s/p fracture    S/P TUAN-BSO (total abdominal hysterectomy and bilateral salpingo-oophorectomy)

## 2019-08-23 NOTE — H&P ADULT - PROBLEM SELECTOR PLAN 1
- patient with increased urinary frequency, suprapubic tenderness, mild leukocytosis, elevated lactate, and positive UA concerning for UTI  - continue Ceftriaxone (cultures from 06/16 grew E.coli sensitive to ceftriaxone)  - UCx, BCx pending - patient with increased urinary frequency, suprapubic tenderness, mild leukocytosis, elevated lactate, and positive UA concerning for UTI  - continue Ceftriaxone (cultures from 06/18 grew E.coli sensitive to ceftriaxone)  - UCx, BCx pending

## 2019-08-23 NOTE — DISCHARGE NOTE PROVIDER - CARE PROVIDER_API CALL
Bhavin Gardner)  Internal Medicine  [address], [address]  [place] [address]  Phone: [phone]  Fax: [phone]  Follow Up Time:

## 2019-08-23 NOTE — H&P ADULT - PROBLEM SELECTOR PROBLEM 4
GERD (gastroesophageal reflux disease) Dementia without behavioral disturbance, unspecified dementia type Macrocytosis without anemia
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
## 2019-08-23 NOTE — H&P ADULT - NSHPSOCIALHISTORY_GEN_ALL_CORE
Patient lives with daughter and her family. No smoking history, no alcohol, no recreational drugs. Patient has difficulty performing ADLs.

## 2019-08-23 NOTE — H&P ADULT - PROBLEM SELECTOR PLAN 6
- DVT ppx: HSQ  - Diet: regular diet - patient on nexium at home  - continue esomeprazole while inpatient

## 2019-08-24 DIAGNOSIS — R65.10 SYSTEMIC INFLAMMATORY RESPONSE SYNDROME (SIRS) OF NON-INFECTIOUS ORIGIN WITHOUT ACUTE ORGAN DYSFUNCTION: ICD-10-CM

## 2019-08-24 LAB
-  COAGULASE NEGATIVE STAPHYLOCOCCUS: SIGNIFICANT CHANGE UP
ANION GAP SERPL CALC-SCNC: 16 MMOL/L — SIGNIFICANT CHANGE UP (ref 5–17)
BASOPHILS # BLD AUTO: 0.02 K/UL — SIGNIFICANT CHANGE UP (ref 0–0.2)
BASOPHILS NFR BLD AUTO: 0.1 % — SIGNIFICANT CHANGE UP (ref 0–2)
BUN SERPL-MCNC: 16 MG/DL — SIGNIFICANT CHANGE UP (ref 7–23)
CALCIUM SERPL-MCNC: 9.7 MG/DL — SIGNIFICANT CHANGE UP (ref 8.4–10.5)
CHLORIDE SERPL-SCNC: 99 MMOL/L — SIGNIFICANT CHANGE UP (ref 96–108)
CO2 SERPL-SCNC: 20 MMOL/L — LOW (ref 22–31)
CREAT SERPL-MCNC: 1.24 MG/DL — SIGNIFICANT CHANGE UP (ref 0.5–1.3)
CULTURE RESULTS: SIGNIFICANT CHANGE UP
EOSINOPHIL # BLD AUTO: 0 K/UL — SIGNIFICANT CHANGE UP (ref 0–0.5)
EOSINOPHIL NFR BLD AUTO: 0 % — SIGNIFICANT CHANGE UP (ref 0–6)
GLUCOSE SERPL-MCNC: 132 MG/DL — HIGH (ref 70–99)
GRAM STN FLD: SIGNIFICANT CHANGE UP
HCT VFR BLD CALC: 46.1 % — HIGH (ref 34.5–45)
HGB BLD-MCNC: 14.7 G/DL — SIGNIFICANT CHANGE UP (ref 11.5–15.5)
IMM GRANULOCYTES NFR BLD AUTO: 0.6 % — SIGNIFICANT CHANGE UP (ref 0–1.5)
LACTATE SERPL-SCNC: 1.7 MMOL/L — SIGNIFICANT CHANGE UP (ref 0.7–2)
LYMPHOCYTES # BLD AUTO: 1.25 K/UL — SIGNIFICANT CHANGE UP (ref 1–3.3)
LYMPHOCYTES # BLD AUTO: 7.9 % — LOW (ref 13–44)
MCHC RBC-ENTMCNC: 31.7 PG — SIGNIFICANT CHANGE UP (ref 27–34)
MCHC RBC-ENTMCNC: 31.9 GM/DL — LOW (ref 32–36)
MCV RBC AUTO: 99.4 FL — SIGNIFICANT CHANGE UP (ref 80–100)
METHOD TYPE: SIGNIFICANT CHANGE UP
MONOCYTES # BLD AUTO: 1.09 K/UL — HIGH (ref 0–0.9)
MONOCYTES NFR BLD AUTO: 6.9 % — SIGNIFICANT CHANGE UP (ref 2–14)
NEUTROPHILS # BLD AUTO: 13.31 K/UL — HIGH (ref 1.8–7.4)
NEUTROPHILS NFR BLD AUTO: 84.5 % — HIGH (ref 43–77)
PLATELET # BLD AUTO: 240 K/UL — SIGNIFICANT CHANGE UP (ref 150–400)
POTASSIUM SERPL-MCNC: 5.4 MMOL/L — HIGH (ref 3.5–5.3)
POTASSIUM SERPL-SCNC: 5.4 MMOL/L — HIGH (ref 3.5–5.3)
RBC # BLD: 4.64 M/UL — SIGNIFICANT CHANGE UP (ref 3.8–5.2)
RBC # FLD: 14.7 % — HIGH (ref 10.3–14.5)
SODIUM SERPL-SCNC: 135 MMOL/L — SIGNIFICANT CHANGE UP (ref 135–145)
SPECIMEN SOURCE: SIGNIFICANT CHANGE UP
WBC # BLD: 15.76 K/UL — HIGH (ref 3.8–10.5)
WBC # FLD AUTO: 15.76 K/UL — HIGH (ref 3.8–10.5)

## 2019-08-24 PROCEDURE — 99233 SBSQ HOSP IP/OBS HIGH 50: CPT

## 2019-08-24 RX ORDER — SODIUM CHLORIDE 9 MG/ML
1000 INJECTION, SOLUTION INTRAVENOUS
Refills: 0 | Status: COMPLETED | OUTPATIENT
Start: 2019-08-24 | End: 2019-08-25

## 2019-08-24 RX ADMIN — HEPARIN SODIUM 5000 UNIT(S): 5000 INJECTION INTRAVENOUS; SUBCUTANEOUS at 13:04

## 2019-08-24 RX ADMIN — PREGABALIN 1000 MICROGRAM(S): 225 CAPSULE ORAL at 13:04

## 2019-08-24 RX ADMIN — CEFTRIAXONE 100 MILLIGRAM(S): 500 INJECTION, POWDER, FOR SOLUTION INTRAMUSCULAR; INTRAVENOUS at 01:19

## 2019-08-24 RX ADMIN — HEPARIN SODIUM 5000 UNIT(S): 5000 INJECTION INTRAVENOUS; SUBCUTANEOUS at 05:26

## 2019-08-24 RX ADMIN — SENNA PLUS 2 TABLET(S): 8.6 TABLET ORAL at 21:08

## 2019-08-24 RX ADMIN — HEPARIN SODIUM 5000 UNIT(S): 5000 INJECTION INTRAVENOUS; SUBCUTANEOUS at 21:08

## 2019-08-24 NOTE — DIETITIAN INITIAL EVALUATION ADULT. - REASON INDICATOR FOR ASSESSMENT
Nutrition consult received for poor PO intake.  Source: Pt's daughter, EMR  Per chart, 89 y/o female admitted for  increased urinary frequency, back pain and altered mental status concerning for UTI  PMHx: dementia, GERD, TAHBSO, UTI, osteoporosis, multiple ankle and hip surgeries

## 2019-08-24 NOTE — DIETITIAN INITIAL EVALUATION ADULT. - ENERGY NEEDS
Ht: 61 inches Wt: 149 pounds (8/22) BMI: 28.3kg/m2 IBW: 105 pounds(+/-10%) 142%IBW  edema. pressure ulcers documented.

## 2019-08-24 NOTE — DIETITIAN INITIAL EVALUATION ADULT. - OTHER INFO
Intake PTA: Per pt's daughter at bedside, pt's PO intake has gradually been decreasing in the past few months, as pt has become more picky with foods. Within past two weeks, daughter noticed a significant decrease in intake. Pt only eats coffee cake/apple turnovers for breakfast, peanut butter and jelly for lunch and mashed potatoes/sometimes meatloaf for dinner.     NKFA, no micronutrient supplementation at home, no nausea/vomiting, no GI distress. At baseline, pt takes soft foods as it is easier for her to chew. Pt tolerates textures well, however pt's daughter agreed mechanical soft diet would be easier for pt while in hospital, as food is in small pieces. Pt has not had BM since admission, 8/22 per daughter, however pt does not want prunes. Pt ordered for stool softeners.     Diet: Pt with poor PO intake in-house, observed pt had <25% of lunch. RD obtained pt food preferences to encourage PO intake. Pt's daughter thinks pt would enjoy Ensure Enlive x2 daily and Ensure pudding daily to increase energy/protein intake.    Weight Hx: According to pt's daughter, pt normally weighs 150 pounds. Per [facility], pt was 146 lbs 10/2018, pt currently weighs 149 pounds (8/22). Wt consistent, noted pt with 1+ generalized edema

## 2019-08-24 NOTE — DIETITIAN INITIAL EVALUATION ADULT. - PHYSICAL APPEARANCE
other (specify) Nutrition-focused physical exam conduct with consent from pt. Pt is appropriately developed for advanced age.

## 2019-08-24 NOTE — DIETITIAN INITIAL EVALUATION ADULT. - PROBLEM SELECTOR PLAN 2
- patient with altered mental status in the setting of infection vs intracranial pathology. As per daughter, patient was found to have a meningioma on CTH incidentally in the past  - treat for UTI, monitor for change in mental status  - f/u CTH given mental status change >3-4wk

## 2019-08-24 NOTE — DIETITIAN INITIAL EVALUATION ADULT. - PROBLEM SELECTOR PLAN 3
mild lactic acidosis s/p ceftriaxone and 1L NS  - obtain repeat lactate to monitor for resolution  - patient encephalopathic however not toxic appearing

## 2019-08-24 NOTE — DIETITIAN INITIAL EVALUATION ADULT. - PROBLEM SELECTOR PLAN 1
- patient with increased urinary frequency, suprapubic tenderness, mild leukocytosis, elevated lactate, and positive UA concerning for UTI  - continue Ceftriaxone (cultures from 06/18 grew E.coli sensitive to ceftriaxone)  - UCx, BCx pending

## 2019-08-24 NOTE — DIETITIAN INITIAL EVALUATION ADULT. - ADD RECOMMEND
1) Recommend Ensure Enlive x2 daily (350kcal and 20g protein per 8oz) 2) Recommend Ensure Pudding x1 daily 3) Recommend Multivitamin daily 4) Recommend to obtain 25-hydroxyvitamin D lab value as pt with hx of osteoporosis 5) Pt aware RD remains available for any concerns, questions or diet preferences 6) Monitor PO intake, GI tolerance

## 2019-08-24 NOTE — PROGRESS NOTE ADULT - SUBJECTIVE AND OBJECTIVE BOX
Patient is a 89y old  Female who presents with a chief complaint of increased urinary frequency (23 Aug 2019 15:38)      SUBJECTIVE / OVERNIGHT EVENTS: no acute events overnight     MEDICATIONS  (STANDING):  cefTRIAXone   IVPB 1000 milliGRAM(s) IV Intermittent every 24 hours  cyanocobalamin Injectable 1000 MICROGram(s) IntraMuscular daily  heparin  Injectable 5000 Unit(s) SubCutaneous every 8 hours  multivitamin 1 Tablet(s) Oral daily  senna 2 Tablet(s) Oral at bedtime  sodium chloride 0.45%. 1000 milliLiter(s) (60 mL/Hr) IV Continuous <Continuous>    MEDICATIONS  (PRN):  acetaminophen   Tablet .. 650 milliGRAM(s) Oral every 6 hours PRN Moderate Pain (4 - 6)  haloperidol    Injectable 1 milliGRAM(s) IntraMuscular every 6 hours PRN Agitation        CAPILLARY BLOOD GLUCOSE        I&O's Summary    23 Aug 2019 07:01  -  24 Aug 2019 07:00  --------------------------------------------------------  IN: 970 mL / OUT: 0 mL / NET: 970 mL        PHYSICAL EXAM:  GENERAL: NAD, well-developed  HEAD:  Atraumatic, Normocephalic  EYES:conjunctiva and sclera clear  NECK:  No JVD  CHEST/LUNG: Clear to auscultation bilaterally; No wheeze  HEART: Regular rate and rhythm; S1S2  ABDOMEN: Soft, Nontender, Nondistended; Bowel sounds present  EXTREMITIES:  2+ Peripheral Pulses, No clubbing, cyanosis, or edema  PSYCH: AAOx1  SKIN: No rashes or lesions    LABS:                        14.7   15.76 )-----------( 240      ( 24 Aug 2019 10:27 )             46.1     08-    135  |  99  |  16  ----------------------------<  132<H>  5.4<H>   |  20<L>  |  1.24    Ca    9.7      24 Aug 2019 06:49  Phos  2.2     08-  Mg     1.8     08-    TPro  7.2  /  Alb  4.0  /  TBili  0.8  /  DBili  x   /  AST  30  /  ALT  22  /  AlkPhos  99  08-          Urinalysis Basic - ( 22 Aug 2019 23:32 )    Color: Yellow / Appearance: Slightly Turbid / S.037 / pH: x  Gluc: x / Ketone: Trace  / Bili: Negative / Urobili: 4 mg/dL   Blood: x / Protein: 100 mg/dL / Nitrite: Negative   Leuk Esterase: Large / RBC: 18 /hpf /  /HPF   Sq Epi: x / Non Sq Epi: 1 /hpf / Bacteria: TNTC        RADIOLOGY & ADDITIONAL TESTS:    Imaging Personally Reviewed:    Consultant(s) Notes Reviewed:      Care Discussed with Consultants/Other Providers:

## 2019-08-25 DIAGNOSIS — N17.9 ACUTE KIDNEY FAILURE, UNSPECIFIED: ICD-10-CM

## 2019-08-25 LAB
ANION GAP SERPL CALC-SCNC: 12 MMOL/L — SIGNIFICANT CHANGE UP (ref 5–17)
APPEARANCE UR: CLEAR — SIGNIFICANT CHANGE UP
BILIRUB UR-MCNC: NEGATIVE — SIGNIFICANT CHANGE UP
BUN SERPL-MCNC: 27 MG/DL — HIGH (ref 7–23)
CALCIUM SERPL-MCNC: 8.8 MG/DL — SIGNIFICANT CHANGE UP (ref 8.4–10.5)
CHLORIDE SERPL-SCNC: 100 MMOL/L — SIGNIFICANT CHANGE UP (ref 96–108)
CO2 SERPL-SCNC: 22 MMOL/L — SIGNIFICANT CHANGE UP (ref 22–31)
COLOR SPEC: SIGNIFICANT CHANGE UP
CREAT ?TM UR-MCNC: 85 MG/DL — SIGNIFICANT CHANGE UP
CREAT SERPL-MCNC: 1.72 MG/DL — HIGH (ref 0.5–1.3)
CULTURE RESULTS: SIGNIFICANT CHANGE UP
DIFF PNL FLD: ABNORMAL
GLUCOSE SERPL-MCNC: 101 MG/DL — HIGH (ref 70–99)
GLUCOSE UR QL: NEGATIVE — SIGNIFICANT CHANGE UP
HCT VFR BLD CALC: 38.5 % — SIGNIFICANT CHANGE UP (ref 34.5–45)
HGB BLD-MCNC: 12.4 G/DL — SIGNIFICANT CHANGE UP (ref 11.5–15.5)
KETONES UR-MCNC: NEGATIVE — SIGNIFICANT CHANGE UP
LEUKOCYTE ESTERASE UR-ACNC: ABNORMAL
MCHC RBC-ENTMCNC: 32.2 GM/DL — SIGNIFICANT CHANGE UP (ref 32–36)
MCHC RBC-ENTMCNC: 32.2 PG — SIGNIFICANT CHANGE UP (ref 27–34)
MCV RBC AUTO: 100 FL — SIGNIFICANT CHANGE UP (ref 80–100)
NITRITE UR-MCNC: NEGATIVE — SIGNIFICANT CHANGE UP
ORGANISM # SPEC MICROSCOPIC CNT: SIGNIFICANT CHANGE UP
ORGANISM # SPEC MICROSCOPIC CNT: SIGNIFICANT CHANGE UP
PH UR: 6.5 — SIGNIFICANT CHANGE UP (ref 5–8)
PHOSPHATE SERPL-MCNC: 3.3 MG/DL — SIGNIFICANT CHANGE UP (ref 2.5–4.5)
PLATELET # BLD AUTO: 221 K/UL — SIGNIFICANT CHANGE UP (ref 150–400)
POTASSIUM SERPL-MCNC: 3.9 MMOL/L — SIGNIFICANT CHANGE UP (ref 3.5–5.3)
POTASSIUM SERPL-SCNC: 3.9 MMOL/L — SIGNIFICANT CHANGE UP (ref 3.5–5.3)
PROT UR-MCNC: SIGNIFICANT CHANGE UP
RBC # BLD: 3.85 M/UL — SIGNIFICANT CHANGE UP (ref 3.8–5.2)
RBC # FLD: 14.8 % — HIGH (ref 10.3–14.5)
SODIUM SERPL-SCNC: 134 MMOL/L — LOW (ref 135–145)
SODIUM UR-SCNC: <20 MMOL/L — SIGNIFICANT CHANGE UP
SP GR SPEC: 1.01 — SIGNIFICANT CHANGE UP (ref 1.01–1.02)
SPECIMEN SOURCE: SIGNIFICANT CHANGE UP
UROBILINOGEN FLD QL: SIGNIFICANT CHANGE UP
UUN UR-MCNC: 424 MG/DL — SIGNIFICANT CHANGE UP
WBC # BLD: 12.11 K/UL — HIGH (ref 3.8–10.5)
WBC # FLD AUTO: 12.11 K/UL — HIGH (ref 3.8–10.5)

## 2019-08-25 PROCEDURE — 74177 CT ABD & PELVIS W/CONTRAST: CPT | Mod: 26

## 2019-08-25 PROCEDURE — 99233 SBSQ HOSP IP/OBS HIGH 50: CPT

## 2019-08-25 PROCEDURE — 71260 CT THORAX DX C+: CPT | Mod: 26

## 2019-08-25 RX ORDER — DOCUSATE SODIUM 100 MG
100 CAPSULE ORAL
Refills: 0 | Status: DISCONTINUED | OUTPATIENT
Start: 2019-08-25 | End: 2019-08-28

## 2019-08-25 RX ORDER — SODIUM CHLORIDE 9 MG/ML
1000 INJECTION INTRAMUSCULAR; INTRAVENOUS; SUBCUTANEOUS
Refills: 0 | Status: DISCONTINUED | OUTPATIENT
Start: 2019-08-25 | End: 2019-08-28

## 2019-08-25 RX ADMIN — CEFTRIAXONE 100 MILLIGRAM(S): 500 INJECTION, POWDER, FOR SOLUTION INTRAMUSCULAR; INTRAVENOUS at 01:08

## 2019-08-25 RX ADMIN — SODIUM CHLORIDE 55 MILLILITER(S): 9 INJECTION INTRAMUSCULAR; INTRAVENOUS; SUBCUTANEOUS at 20:32

## 2019-08-25 RX ADMIN — SODIUM CHLORIDE 50 MILLILITER(S): 9 INJECTION, SOLUTION INTRAVENOUS at 16:37

## 2019-08-25 RX ADMIN — HEPARIN SODIUM 5000 UNIT(S): 5000 INJECTION INTRAVENOUS; SUBCUTANEOUS at 14:38

## 2019-08-25 RX ADMIN — Medication 1 TABLET(S): at 14:38

## 2019-08-25 RX ADMIN — SENNA PLUS 2 TABLET(S): 8.6 TABLET ORAL at 21:23

## 2019-08-25 RX ADMIN — HEPARIN SODIUM 5000 UNIT(S): 5000 INJECTION INTRAVENOUS; SUBCUTANEOUS at 06:30

## 2019-08-25 RX ADMIN — Medication 100 MILLIGRAM(S): at 18:21

## 2019-08-25 RX ADMIN — Medication 10 MILLIGRAM(S): at 10:23

## 2019-08-25 RX ADMIN — PREGABALIN 1000 MICROGRAM(S): 225 CAPSULE ORAL at 14:38

## 2019-08-25 RX ADMIN — HEPARIN SODIUM 5000 UNIT(S): 5000 INJECTION INTRAVENOUS; SUBCUTANEOUS at 21:23

## 2019-08-25 NOTE — PROGRESS NOTE ADULT - PROBLEM SELECTOR PLAN 8
- patient on nexium at home  - continue esomeprazole while inpatient - DVT ppx: HSQ  - Diet: regular diet

## 2019-08-25 NOTE — PROGRESS NOTE ADULT - PROBLEM SELECTOR PLAN 6
- patient with increased MCV of 102  - b12 <150, c/w b12 1000mcg - patient with some cognitive impairment  - now with superimposed delirium  - continue to monitor  - fall precautions  - PT   - improved

## 2019-08-25 NOTE — PROGRESS NOTE ADULT - PROBLEM SELECTOR PLAN 4
- patient with altered mental status in the setting of infection  - CT head unremarkable  - workup as above mild lactic acidosis   - s/p  1L NS  - improved

## 2019-08-25 NOTE — PROGRESS NOTE ADULT - SUBJECTIVE AND OBJECTIVE BOX
Patient is a 89y old  Female who presents with a chief complaint of increased urinary frequency (24 Aug 2019 16:02)      SUBJECTIVE / OVERNIGHT EVENTS: pt somewhat irritated, denies pain     MEDICATIONS  (STANDING):  cyanocobalamin Injectable 1000 MICROGram(s) IntraMuscular daily  docusate sodium 100 milliGRAM(s) Oral two times a day  heparin  Injectable 5000 Unit(s) SubCutaneous every 8 hours  multivitamin 1 Tablet(s) Oral daily  senna 2 Tablet(s) Oral at bedtime  sodium chloride 0.9%. 1000 milliLiter(s) (55 mL/Hr) IV Continuous <Continuous>    MEDICATIONS  (PRN):  acetaminophen   Tablet .. 650 milliGRAM(s) Oral every 6 hours PRN Moderate Pain (4 - 6)        CAPILLARY BLOOD GLUCOSE        I&O's Summary    24 Aug 2019 07:01  -  25 Aug 2019 07:00  --------------------------------------------------------  IN: 110 mL / OUT: 1050 mL / NET: -940 mL        PHYSICAL EXAM:  GENERAL: NAD, well-developed  HEAD:  Atraumatic, Normocephalic  EYES: conjunctiva and sclera clear  NECK:  No JVD  CHEST/LUNG: Clear to auscultation bilaterally; No wheeze  HEART: Regular rate and rhythm; S1S2  ABDOMEN: Soft, Nontender, Nondistended; Bowel sounds present  EXTREMITIES:  2+ Peripheral Pulses  PSYCH: AAOx1-2    LABS:                        12.4   12.11 )-----------( 221      ( 25 Aug 2019 08:52 )             38.5     08    134<L>  |  100  |  27<H>  ----------------------------<  101<H>  3.9   |  22  |  1.72<H>    Ca    8.8      25 Aug 2019 07:19  Phos  3.3                 Urinalysis Basic - ( 25 Aug 2019 12:06 )    Color: Light Yellow / Appearance: Clear / S.010 / pH: x  Gluc: x / Ketone: Negative  / Bili: Negative / Urobili: <2 mg/dL   Blood: x / Protein: Trace / Nitrite: Negative   Leuk Esterase: Large / RBC: 13 /HPF / WBC 30 /HPF   Sq Epi: x / Non Sq Epi: 2 /HPF / Bacteria: Negative        RADIOLOGY & ADDITIONAL TESTS:    Imaging Personally Reviewed:    Consultant(s) Notes Reviewed:      Care Discussed with Consultants/Other Providers:

## 2019-08-25 NOTE — PROVIDER CONTACT NOTE (OTHER) - SITUATION
Patient having urinary retention, has not voided in over 8 hours. Bladder scan reveals greater than 700 cc urine

## 2019-08-25 NOTE — PROGRESS NOTE ADULT - PROBLEM SELECTOR PLAN 7
- patient with some cognitive impairment  - now with superimposed delirium  - continue to monitor  - fall precautions  - PT   - improved - patient on nexium at home  - continue esomeprazole while inpatient

## 2019-08-25 NOTE — PROGRESS NOTE ADULT - PROBLEM SELECTOR PROBLEM 7
Dementia without behavioral disturbance, unspecified dementia type GERD (gastroesophageal reflux disease)

## 2019-08-26 DIAGNOSIS — K59.00 CONSTIPATION, UNSPECIFIED: ICD-10-CM

## 2019-08-26 DIAGNOSIS — R33.9 RETENTION OF URINE, UNSPECIFIED: ICD-10-CM

## 2019-08-26 LAB
24R-OH-CALCIDIOL SERPL-MCNC: 8.1 NG/ML — LOW (ref 30–80)
ALBUMIN SERPL ELPH-MCNC: 2.8 G/DL — LOW (ref 3.3–5)
ALP SERPL-CCNC: 72 U/L — SIGNIFICANT CHANGE UP (ref 40–120)
ALT FLD-CCNC: 17 U/L — SIGNIFICANT CHANGE UP (ref 10–45)
ANION GAP SERPL CALC-SCNC: 13 MMOL/L — SIGNIFICANT CHANGE UP (ref 5–17)
AST SERPL-CCNC: 26 U/L — SIGNIFICANT CHANGE UP (ref 10–40)
BASOPHILS # BLD AUTO: 0.04 K/UL — SIGNIFICANT CHANGE UP (ref 0–0.2)
BASOPHILS NFR BLD AUTO: 0.4 % — SIGNIFICANT CHANGE UP (ref 0–2)
BILIRUB SERPL-MCNC: 0.5 MG/DL — SIGNIFICANT CHANGE UP (ref 0.2–1.2)
BUN SERPL-MCNC: 19 MG/DL — SIGNIFICANT CHANGE UP (ref 7–23)
CALCIUM SERPL-MCNC: 8.9 MG/DL — SIGNIFICANT CHANGE UP (ref 8.4–10.5)
CHLORIDE SERPL-SCNC: 104 MMOL/L — SIGNIFICANT CHANGE UP (ref 96–108)
CO2 SERPL-SCNC: 21 MMOL/L — LOW (ref 22–31)
CREAT SERPL-MCNC: 0.9 MG/DL — SIGNIFICANT CHANGE UP (ref 0.5–1.3)
CRP SERPL-MCNC: 5.02 MG/DL — HIGH (ref 0–0.4)
EOSINOPHIL # BLD AUTO: 0.52 K/UL — HIGH (ref 0–0.5)
EOSINOPHIL NFR BLD AUTO: 5.7 % — SIGNIFICANT CHANGE UP (ref 0–6)
ERYTHROCYTE [SEDIMENTATION RATE] IN BLOOD: 32 MM/HR — HIGH (ref 0–20)
GLUCOSE SERPL-MCNC: 110 MG/DL — HIGH (ref 70–99)
HCT VFR BLD CALC: 36.4 % — SIGNIFICANT CHANGE UP (ref 34.5–45)
HGB BLD-MCNC: 12 G/DL — SIGNIFICANT CHANGE UP (ref 11.5–15.5)
IMM GRANULOCYTES NFR BLD AUTO: 0.3 % — SIGNIFICANT CHANGE UP (ref 0–1.5)
LYMPHOCYTES # BLD AUTO: 1.32 K/UL — SIGNIFICANT CHANGE UP (ref 1–3.3)
LYMPHOCYTES # BLD AUTO: 14.4 % — SIGNIFICANT CHANGE UP (ref 13–44)
MAGNESIUM SERPL-MCNC: 1.9 MG/DL — SIGNIFICANT CHANGE UP (ref 1.6–2.6)
MCHC RBC-ENTMCNC: 32.5 PG — SIGNIFICANT CHANGE UP (ref 27–34)
MCHC RBC-ENTMCNC: 33 GM/DL — SIGNIFICANT CHANGE UP (ref 32–36)
MCV RBC AUTO: 98.6 FL — SIGNIFICANT CHANGE UP (ref 80–100)
MONOCYTES # BLD AUTO: 0.7 K/UL — SIGNIFICANT CHANGE UP (ref 0–0.9)
MONOCYTES NFR BLD AUTO: 7.7 % — SIGNIFICANT CHANGE UP (ref 2–14)
NEUTROPHILS # BLD AUTO: 6.54 K/UL — SIGNIFICANT CHANGE UP (ref 1.8–7.4)
NEUTROPHILS NFR BLD AUTO: 71.5 % — SIGNIFICANT CHANGE UP (ref 43–77)
OSMOLALITY UR: 236 MOSM/KG — SIGNIFICANT CHANGE UP (ref 50–1200)
PHOSPHATE SERPL-MCNC: 3.3 MG/DL — SIGNIFICANT CHANGE UP (ref 2.5–4.5)
PLATELET # BLD AUTO: 213 K/UL — SIGNIFICANT CHANGE UP (ref 150–400)
POTASSIUM SERPL-MCNC: 3.6 MMOL/L — SIGNIFICANT CHANGE UP (ref 3.5–5.3)
POTASSIUM SERPL-SCNC: 3.6 MMOL/L — SIGNIFICANT CHANGE UP (ref 3.5–5.3)
PROT SERPL-MCNC: 5.5 G/DL — LOW (ref 6–8.3)
RBC # BLD: 3.69 M/UL — LOW (ref 3.8–5.2)
RBC # FLD: 14.3 % — SIGNIFICANT CHANGE UP (ref 10.3–14.5)
SODIUM SERPL-SCNC: 138 MMOL/L — SIGNIFICANT CHANGE UP (ref 135–145)
WBC # BLD: 9.15 K/UL — SIGNIFICANT CHANGE UP (ref 3.8–10.5)
WBC # FLD AUTO: 9.15 K/UL — SIGNIFICANT CHANGE UP (ref 3.8–10.5)

## 2019-08-26 PROCEDURE — 99233 SBSQ HOSP IP/OBS HIGH 50: CPT

## 2019-08-26 RX ADMIN — Medication 100 MILLIGRAM(S): at 18:20

## 2019-08-26 RX ADMIN — HEPARIN SODIUM 5000 UNIT(S): 5000 INJECTION INTRAVENOUS; SUBCUTANEOUS at 21:11

## 2019-08-26 RX ADMIN — SENNA PLUS 2 TABLET(S): 8.6 TABLET ORAL at 21:12

## 2019-08-26 RX ADMIN — HEPARIN SODIUM 5000 UNIT(S): 5000 INJECTION INTRAVENOUS; SUBCUTANEOUS at 13:12

## 2019-08-26 RX ADMIN — Medication 100 MILLIGRAM(S): at 06:11

## 2019-08-26 RX ADMIN — Medication 1 TABLET(S): at 13:11

## 2019-08-26 RX ADMIN — PREGABALIN 1000 MICROGRAM(S): 225 CAPSULE ORAL at 13:11

## 2019-08-26 RX ADMIN — Medication 5 MILLIGRAM(S): at 18:20

## 2019-08-26 RX ADMIN — Medication 5 MILLIGRAM(S): at 10:26

## 2019-08-26 RX ADMIN — HEPARIN SODIUM 5000 UNIT(S): 5000 INJECTION INTRAVENOUS; SUBCUTANEOUS at 06:11

## 2019-08-26 NOTE — PROGRESS NOTE ADULT - PROBLEM SELECTOR PLAN 5
- likely due to urinary retention due to anticholinergics, UTI, constipation   Cr back down to normal   - manrique now in, continue for now until BM and patient ambulating.  - avoid nephrotoxic agents

## 2019-08-26 NOTE — PROGRESS NOTE ADULT - SUBJECTIVE AND OBJECTIVE BOX
Patient is a 89y old  Female who presents with a chief complaint of increased urinary frequency (25 Aug 2019 14:43)      SUBJECTIVE / OVERNIGHT EVENTS: Patient seen and examined at bedside - patient says she is doing well, no complaints, thinks she is at her home. Denies pain/discomfort    ROS:  unable to obtain due to dementia, patient denies any new complaints    Allergies    No Known Allergies    Intolerances        MEDICATIONS  (STANDING):  bisacodyl 5 milliGRAM(s) Oral every 12 hours  cyanocobalamin Injectable 1000 MICROGram(s) IntraMuscular daily  docusate sodium 100 milliGRAM(s) Oral two times a day  heparin  Injectable 5000 Unit(s) SubCutaneous every 8 hours  multivitamin 1 Tablet(s) Oral daily  senna 2 Tablet(s) Oral at bedtime  sodium chloride 0.9%. 1000 milliLiter(s) (55 mL/Hr) IV Continuous <Continuous>    MEDICATIONS  (PRN):  acetaminophen   Tablet .. 650 milliGRAM(s) Oral every 6 hours PRN Moderate Pain (4 - 6)      Vital Signs Last 24 Hrs  T(C): 37.1 (26 Aug 2019 06:06), Max: 37.2 (25 Aug 2019 20:07)  T(F): 98.8 (26 Aug 2019 06:06), Max: 98.9 (25 Aug 2019 20:07)  HR: 81 (26 Aug 2019 06:06) (74 - 84)  BP: 146/83 (26 Aug 2019 06:06) (136/79 - 146/83)  BP(mean): --  RR: 18 (26 Aug 2019 06:06) (18 - 18)  SpO2: 92% (26 Aug 2019 06:06) (92% - 95%)  CAPILLARY BLOOD GLUCOSE        I&O's Summary    25 Aug 2019 07:01  -  26 Aug 2019 07:00  --------------------------------------------------------  IN: 845 mL / OUT: 1350 mL / NET: -505 mL        PHYSICAL EXAM:  GENERAL: NAD  HEAD:  Atraumatic, Normocephalic  EYES: EOMI, PERRLA, conjunctiva and sclera clear  NECK: Supple, No JVD  CHEST/LUNG: Clear to auscultation bilaterally; No wheeze  HEART: Regular rate and rhythm; No murmurs, rubs, or gallops  ABDOMEN: Soft, Nontender, Nondistended; Bowel sounds present  EXTREMITIES:  2+ Peripheral Pulses, No clubbing, cyanosis, or edema  NEUROLOGY: AAOx1, non-focal, patient thinks she is at home and that's it close to Easter  PSYCH: calm  SKIN: No rashes or lesions    LABS:                        12.0   9.15  )-----------( 213      ( 26 Aug 2019 10:01 )             36.4         138  |  104  |  19  ----------------------------<  110<H>  3.6   |  21<L>  |  0.90    Ca    8.9      26 Aug 2019 07:05  Phos  3.3       Mg     1.9         TPro  5.5<L>  /  Alb  2.8<L>  /  TBili  0.5  /  DBili  x   /  AST  26  /  ALT  17  /  AlkPhos  72            Urinalysis Basic - ( 25 Aug 2019 12:06 )    Color: Light Yellow / Appearance: Clear / S.010 / pH: x  Gluc: x / Ketone: Negative  / Bili: Negative / Urobili: <2 mg/dL   Blood: x / Protein: Trace / Nitrite: Negative   Leuk Esterase: Large / RBC: 13 /HPF / WBC 30 /HPF   Sq Epi: x / Non Sq Epi: 2 /HPF / Bacteria: Negative            Case Discussed with Family:    Goals of Care:

## 2019-08-26 NOTE — PROGRESS NOTE ADULT - PROBLEM SELECTOR PLAN 4
Likely due to anticholinergic, constipation, UTI  c/w manrique, proceed with TOV once has BM and more mobile

## 2019-08-26 NOTE — PROGRESS NOTE ADULT - PROBLEM SELECTOR PLAN 8
- patient with some cognitive impairment with superimposed delirium  - continue to monitor  - fall precautions  - improved but still confused  redirect, avoid benzos/antipsych, OOB

## 2019-08-27 PROCEDURE — 99233 SBSQ HOSP IP/OBS HIGH 50: CPT

## 2019-08-27 RX ADMIN — Medication 650 MILLIGRAM(S): at 13:05

## 2019-08-27 RX ADMIN — Medication 650 MILLIGRAM(S): at 13:43

## 2019-08-27 RX ADMIN — HEPARIN SODIUM 5000 UNIT(S): 5000 INJECTION INTRAVENOUS; SUBCUTANEOUS at 05:08

## 2019-08-27 RX ADMIN — HEPARIN SODIUM 5000 UNIT(S): 5000 INJECTION INTRAVENOUS; SUBCUTANEOUS at 21:27

## 2019-08-27 RX ADMIN — Medication 1 TABLET(S): at 13:05

## 2019-08-27 RX ADMIN — SENNA PLUS 2 TABLET(S): 8.6 TABLET ORAL at 21:28

## 2019-08-27 RX ADMIN — Medication 100 MILLIGRAM(S): at 05:08

## 2019-08-27 RX ADMIN — HEPARIN SODIUM 5000 UNIT(S): 5000 INJECTION INTRAVENOUS; SUBCUTANEOUS at 13:05

## 2019-08-27 RX ADMIN — Medication 5 MILLIGRAM(S): at 05:11

## 2019-08-27 NOTE — PROGRESS NOTE ADULT - PROBLEM SELECTOR PLAN 4
Likely due to anticholinergic, constipation, UTI  c/w manrique, proceed with TOV once has BM and more mobile  If d/c today will do TOV at [facility], if staying another night will do TOV today

## 2019-08-27 NOTE — PROGRESS NOTE ADULT - PROBLEM SELECTOR PLAN 8
- patient with some cognitive impairment with superimposed delirium  - continue to monitor  - fall precautions  - appears to be at baseline  redirect, avoid benzos/antipsych, OOB

## 2019-08-27 NOTE — PROGRESS NOTE ADULT - SUBJECTIVE AND OBJECTIVE BOX
Patient is a 89y old  Female who presents with a chief complaint of increased urinary frequency (26 Aug 2019 12:19)      SUBJECTIVE / OVERNIGHT EVENTS: Patient seen and examined at bedside - patient in bed says she is doing well, daughter at bedside. Patient denies any new pain, discomfort    ROS:  unable to fully assess due to dementia    Allergies    No Known Allergies    Intolerances        MEDICATIONS  (STANDING):  bisacodyl 5 milliGRAM(s) Oral every 12 hours  docusate sodium 100 milliGRAM(s) Oral two times a day  heparin  Injectable 5000 Unit(s) SubCutaneous every 8 hours  multivitamin 1 Tablet(s) Oral daily  senna 2 Tablet(s) Oral at bedtime  sodium chloride 0.9%. 1000 milliLiter(s) (55 mL/Hr) IV Continuous <Continuous>    MEDICATIONS  (PRN):  acetaminophen   Tablet .. 650 milliGRAM(s) Oral every 6 hours PRN Moderate Pain (4 - 6)      Vital Signs Last 24 Hrs  T(C): 36.9 (27 Aug 2019 09:28), Max: 37.1 (26 Aug 2019 20:43)  T(F): 98.4 (27 Aug 2019 09:28), Max: 98.8 (26 Aug 2019 20:43)  HR: 84 (27 Aug 2019 09:28) (79 - 86)  BP: 150/85 (27 Aug 2019 09:28) (150/85 - 155/86)  BP(mean): --  RR: 18 (27 Aug 2019 09:28) (18 - 19)  SpO2: 98% (27 Aug 2019 09:28) (94% - 98%)  CAPILLARY BLOOD GLUCOSE        I&O's Summary    26 Aug 2019 07:01  -  27 Aug 2019 07:00  --------------------------------------------------------  IN: 360 mL / OUT: 675 mL / NET: -315 mL        PHYSICAL EXAM:  GENERAL: NAD  HEAD:  Atraumatic, Normocephalic  EYES: EOMI, PERRLA, conjunctiva and sclera clear  NECK: Supple, No JVD  CHEST/LUNG: Clear to auscultation bilaterally; No wheeze  HEART: Regular rate and rhythm; No murmurs, rubs, or gallops  ABDOMEN: Soft, Nontender, Nondistended; Bowel sounds present  EXTREMITIES:  2+ Peripheral Pulses, No clubbing, cyanosis, or edema  NEUROLOGY: AAOx1, non-focal  PSYCH: calm  SKIN: No rashes or lesions    LABS:                        12.0   9.15  )-----------( 213      ( 26 Aug 2019 10:01 )             36.4     08-26    138  |  104  |  19  ----------------------------<  110<H>  3.6   |  21<L>  |  0.90    Ca    8.9      26 Aug 2019 07:05  Phos  3.3     08-26  Mg     1.9     08-26    TPro  5.5<L>  /  Alb  2.8<L>  /  TBili  0.5  /  DBili  x   /  AST  26  /  ALT  17  /  AlkPhos  72  08-26          Case Discussed with Family: d/w daughter, she is agreeable for discharge, awaiting auth for GIACOMO

## 2019-08-28 ENCOUNTER — TRANSCRIPTION ENCOUNTER (OUTPATIENT)
Age: 84
End: 2019-08-28

## 2019-08-28 VITALS
DIASTOLIC BLOOD PRESSURE: 83 MMHG | OXYGEN SATURATION: 93 % | TEMPERATURE: 98 F | HEART RATE: 78 BPM | SYSTOLIC BLOOD PRESSURE: 144 MMHG | RESPIRATION RATE: 17 BRPM

## 2019-08-28 LAB
CULTURE RESULTS: SIGNIFICANT CHANGE UP
SPECIMEN SOURCE: SIGNIFICANT CHANGE UP

## 2019-08-28 PROCEDURE — 82570 ASSAY OF URINE CREATININE: CPT

## 2019-08-28 PROCEDURE — 70450 CT HEAD/BRAIN W/O DYE: CPT

## 2019-08-28 PROCEDURE — 80053 COMPREHEN METABOLIC PANEL: CPT

## 2019-08-28 PROCEDURE — 82435 ASSAY OF BLOOD CHLORIDE: CPT

## 2019-08-28 PROCEDURE — 87040 BLOOD CULTURE FOR BACTERIA: CPT

## 2019-08-28 PROCEDURE — 84100 ASSAY OF PHOSPHORUS: CPT

## 2019-08-28 PROCEDURE — 83605 ASSAY OF LACTIC ACID: CPT

## 2019-08-28 PROCEDURE — 82746 ASSAY OF FOLIC ACID SERUM: CPT

## 2019-08-28 PROCEDURE — 85027 COMPLETE CBC AUTOMATED: CPT

## 2019-08-28 PROCEDURE — 71260 CT THORAX DX C+: CPT

## 2019-08-28 PROCEDURE — 97530 THERAPEUTIC ACTIVITIES: CPT

## 2019-08-28 PROCEDURE — 85652 RBC SED RATE AUTOMATED: CPT

## 2019-08-28 PROCEDURE — 96374 THER/PROPH/DIAG INJ IV PUSH: CPT | Mod: XU

## 2019-08-28 PROCEDURE — 83735 ASSAY OF MAGNESIUM: CPT

## 2019-08-28 PROCEDURE — 97116 GAIT TRAINING THERAPY: CPT

## 2019-08-28 PROCEDURE — 82306 VITAMIN D 25 HYDROXY: CPT

## 2019-08-28 PROCEDURE — 84300 ASSAY OF URINE SODIUM: CPT

## 2019-08-28 PROCEDURE — 82330 ASSAY OF CALCIUM: CPT

## 2019-08-28 PROCEDURE — 80048 BASIC METABOLIC PNL TOTAL CA: CPT

## 2019-08-28 PROCEDURE — 74177 CT ABD & PELVIS W/CONTRAST: CPT

## 2019-08-28 PROCEDURE — 82803 BLOOD GASES ANY COMBINATION: CPT

## 2019-08-28 PROCEDURE — 87150 DNA/RNA AMPLIFIED PROBE: CPT

## 2019-08-28 PROCEDURE — 84540 ASSAY OF URINE/UREA-N: CPT

## 2019-08-28 PROCEDURE — 82565 ASSAY OF CREATININE: CPT

## 2019-08-28 PROCEDURE — 51701 INSERT BLADDER CATHETER: CPT

## 2019-08-28 PROCEDURE — 82947 ASSAY GLUCOSE BLOOD QUANT: CPT

## 2019-08-28 PROCEDURE — 83935 ASSAY OF URINE OSMOLALITY: CPT

## 2019-08-28 PROCEDURE — 87086 URINE CULTURE/COLONY COUNT: CPT

## 2019-08-28 PROCEDURE — 99239 HOSP IP/OBS DSCHRG MGMT >30: CPT

## 2019-08-28 PROCEDURE — 99285 EMERGENCY DEPT VISIT HI MDM: CPT | Mod: 25

## 2019-08-28 PROCEDURE — 82607 VITAMIN B-12: CPT

## 2019-08-28 PROCEDURE — 85014 HEMATOCRIT: CPT

## 2019-08-28 PROCEDURE — 86140 C-REACTIVE PROTEIN: CPT

## 2019-08-28 PROCEDURE — 81001 URINALYSIS AUTO W/SCOPE: CPT

## 2019-08-28 PROCEDURE — 97162 PT EVAL MOD COMPLEX 30 MIN: CPT

## 2019-08-28 PROCEDURE — 84295 ASSAY OF SERUM SODIUM: CPT

## 2019-08-28 PROCEDURE — 93005 ELECTROCARDIOGRAM TRACING: CPT

## 2019-08-28 PROCEDURE — 84132 ASSAY OF SERUM POTASSIUM: CPT

## 2019-08-28 RX ORDER — POLYETHYLENE GLYCOL 3350 17 G/17G
17 POWDER, FOR SOLUTION ORAL
Qty: 0 | Refills: 0 | DISCHARGE
Start: 2019-08-28

## 2019-08-28 RX ORDER — ACETAMINOPHEN 500 MG
2 TABLET ORAL
Qty: 0 | Refills: 0 | DISCHARGE
Start: 2019-08-28

## 2019-08-28 RX ORDER — POLYETHYLENE GLYCOL 3350 17 G/17G
17 POWDER, FOR SOLUTION ORAL DAILY
Refills: 0 | Status: DISCONTINUED | OUTPATIENT
Start: 2019-08-28 | End: 2019-08-28

## 2019-08-28 RX ORDER — SENNA PLUS 8.6 MG/1
2 TABLET ORAL
Qty: 0 | Refills: 0 | DISCHARGE
Start: 2019-08-28

## 2019-08-28 RX ORDER — HEPARIN SODIUM 5000 [USP'U]/ML
5000 INJECTION INTRAVENOUS; SUBCUTANEOUS
Qty: 0 | Refills: 0 | DISCHARGE
Start: 2019-08-28

## 2019-08-28 RX ORDER — DOCUSATE SODIUM 100 MG
1 CAPSULE ORAL
Qty: 0 | Refills: 0 | DISCHARGE
Start: 2019-08-28

## 2019-08-28 RX ADMIN — POLYETHYLENE GLYCOL 3350 17 GRAM(S): 17 POWDER, FOR SOLUTION ORAL at 13:47

## 2019-08-28 RX ADMIN — Medication 1 TABLET(S): at 13:39

## 2019-08-28 RX ADMIN — HEPARIN SODIUM 5000 UNIT(S): 5000 INJECTION INTRAVENOUS; SUBCUTANEOUS at 13:40

## 2019-08-28 RX ADMIN — Medication 5 MILLIGRAM(S): at 05:11

## 2019-08-28 RX ADMIN — Medication 100 MILLIGRAM(S): at 05:11

## 2019-08-28 RX ADMIN — HEPARIN SODIUM 5000 UNIT(S): 5000 INJECTION INTRAVENOUS; SUBCUTANEOUS at 05:12

## 2019-08-28 NOTE — PROGRESS NOTE ADULT - PROBLEM SELECTOR PLAN 3
c/w colace/senna, now with BMs  CT with possible colitis ?stercoral.
- patient with altered mental status in the setting of infection  - CT head unremarkable  - workup as above   - will start Haloperidol 1mg IM q 6 prn for agitation
- pt with uptrend in cr   - received haldol 1mg 8/23  - bladder scan 450, insert manrique  - check urine lytes  - avoid nephrotoxic agents
mild lactic acidosis   - s/p  1L NS  - check  repeat lactate
c/w colace/senna, now with BMs  CT with possible colitis ?stercoral.
c/w colace/senna, add dulcolax PO bid and tap water enema  CT with possible colitis ?stercoral.   Monitor for BM

## 2019-08-28 NOTE — PROGRESS NOTE ADULT - PROBLEM SELECTOR PROBLEM 2
UTI (urinary tract infection)
UTI (urinary tract infection)
Altered mental status
UTI (urinary tract infection)

## 2019-08-28 NOTE — PROGRESS NOTE ADULT - SUBJECTIVE AND OBJECTIVE BOX
Patient is a 89y old  Female who presents with a chief complaint of increased urinary frequency (27 Aug 2019 13:48)      SUBJECTIVE / OVERNIGHT EVENTS: Patient seen and examined at bedside - patient in bed, had not urinated, denies any pain discomfort, bladder scan with >400, straight cath    ROS:  unable to obtain due to dementia    Allergies    No Known Allergies    Intolerances        MEDICATIONS  (STANDING):  docusate sodium 100 milliGRAM(s) Oral two times a day  heparin  Injectable 5000 Unit(s) SubCutaneous every 8 hours  multivitamin 1 Tablet(s) Oral daily  polyethylene glycol 3350 17 Gram(s) Oral daily  senna 2 Tablet(s) Oral at bedtime  sodium chloride 0.9%. 1000 milliLiter(s) (55 mL/Hr) IV Continuous <Continuous>    MEDICATIONS  (PRN):  acetaminophen   Tablet .. 650 milliGRAM(s) Oral every 6 hours PRN Moderate Pain (4 - 6)      Vital Signs Last 24 Hrs  T(C): 36.7 (28 Aug 2019 04:53), Max: 37.1 (27 Aug 2019 20:39)  T(F): 98 (28 Aug 2019 04:53), Max: 98.8 (27 Aug 2019 20:39)  HR: 77 (28 Aug 2019 04:53) (71 - 77)  BP: 158/77 (28 Aug 2019 04:53) (157/76 - 158/77)  BP(mean): --  RR: 18 (28 Aug 2019 04:53) (18 - 18)  SpO2: 96% (28 Aug 2019 04:53) (95% - 96%)  CAPILLARY BLOOD GLUCOSE        I&O's Summary    27 Aug 2019 07:01  -  28 Aug 2019 07:00  --------------------------------------------------------  IN: 280 mL / OUT: 975 mL / NET: -695 mL    28 Aug 2019 07:01  -  28 Aug 2019 10:14  --------------------------------------------------------  IN: 240 mL / OUT: 0 mL / NET: 240 mL        PHYSICAL EXAM:  GENERAL: NAD, frail  HEAD:  Atraumatic, Normocephalic  EYES: EOMI, PERRLA, conjunctiva and sclera clear  NECK: Supple, No JVD  CHEST/LUNG: Clear to auscultation bilaterally; No wheeze  HEART: Regular rate and rhythm; No murmurs, rubs, or gallops  ABDOMEN: Soft, Nontender, Nondistended; Bowel sounds present  EXTREMITIES:  2+ Peripheral Pulses, No clubbing, cyanosis, or edema  NEUROLOGY: AAOx1, non-focal  PSYCH: calm  SKIN: No rashes or lesions          Case Discussed with Family: d/w daughter, awaiting auth for GIACOMO, will replace burton

## 2019-08-28 NOTE — PROGRESS NOTE ADULT - ASSESSMENT
89 F with PMH of dementia, GERD, UTI, osteoporosis, multiple ankle and hip surgeries presenting with increased urinary frequency, back pain and altered mental status found to have TRINA and SIRS
89 F with PMH of dementia, GERD, UTI, osteoporosis, multiple ankle and hip surgeries presenting with increased urinary frequency, back pain and altered mental status found to have TRINA and SIRS - likely secondary to UTI and colitis (likely stercoral colitis), WBC now trending down, ESR wnl for age, clinically improving, s/p 3 days of ceftriaxone, urinary retention, not urinating, will have to replace manrique catheter. awaiting auth for GIACOMO
89 F with PMH of dementia, GERD, UTI, osteoporosis, multiple ankle and hip surgeries presenting with increased urinary frequency, back pain and altered mental status concerning for UTI
89 F with PMH of dementia, GERD, UTI, osteoporosis, multiple ankle and hip surgeries presenting with increased urinary frequency, back pain and altered mental status found to have TRINA and SIRS - likely secondary to UTI and colitis, WBC now trending down, ESR wnl for age, clinically improving, s/p 3 days of ceftriaxone
89 F with PMH of dementia, GERD, UTI, osteoporosis, multiple ankle and hip surgeries presenting with increased urinary frequency, back pain and altered mental status concerning for UTI
89 F with PMH of dementia, GERD, UTI, osteoporosis, multiple ankle and hip surgeries presenting with increased urinary frequency, back pain and altered mental status found to have TRINA and SIRS - likely secondary to UTI and colitis (likely stercoral colitis), WBC now trending down, ESR wnl for age, clinically improving, s/p 3 days of ceftriaxone, awaiting auth for GIACOMO

## 2019-08-28 NOTE — PROGRESS NOTE ADULT - PROBLEM SELECTOR PLAN 10
- DVT ppx: HSQ  - Diet: regular diet  Plan for GIACOMO

## 2019-08-28 NOTE — PROGRESS NOTE ADULT - PROBLEM SELECTOR PLAN 4
Likely due to anticholinergic, constipation, UTI  TOV failed, will need to replace manrique - TOV to be repeated at [facility] once more mobile

## 2019-08-28 NOTE — PROGRESS NOTE ADULT - PROBLEM SELECTOR PLAN 2
- patient with increased urinary frequency, suprapubic tenderness, mild leukocytosis, elevated lactate on admission   - positive UA concerning for UTI though urine cx with normal lai   - continue Ceftriaxone (cultures from 06/18 grew E.coli sensitive to ceftriaxone)  - blood cx ngtd
- patient with increased urinary frequency, suprapubic tenderness, mild leukocytosis, elevated lactate on admission   - positive UA concerning for UTI though urine cx with normal lai   - s/p 3 days of Ceftriaxone (cultures from 06/18 grew E.coli sensitive to ceftriaxone)  - blood cx ngtd
- patient with altered mental status in the setting of infection  - CT head unremarkable  - treat for UTI and monitor for change in mental status  - will start Haloperidol 1mg IM q 6 prn for agitation
- patient with increased urinary frequency, suprapubic tenderness, mild leukocytosis, elevated lactate on admission   - positive UA concerning for UTI   - s/p 3 days of Ceftriaxone (cultures from 06/18 grew E.coli sensitive to ceftriaxone)  - blood cx ngtd  Patient no longer complaining of symptoms, will observe off antibiotics
- patient with increased urinary frequency, suprapubic tenderness, mild leukocytosis, elevated lactate on admission   - positive UA concerning for UTI though urine cx with normal lai   - s/p 3 days of Ceftriaxone (cultures from 06/18 grew E.coli sensitive to ceftriaxone)  - blood cx ngtd  Patient no longer complaining of symptoms, will observe off antibiotics
- patient with increased urinary frequency, suprapubic tenderness, mild leukocytosis, elevated lactate on admission   - positive UA concerning for UTI   - s/p 3 days of Ceftriaxone (cultures from 06/18 grew E.coli sensitive to ceftriaxone)  - blood cx ngtd  Patient no longer complaining of symptoms, will observe off antibiotics

## 2019-08-28 NOTE — PROGRESS NOTE ADULT - PROBLEM SELECTOR PROBLEM 1
SIRS (systemic inflammatory response syndrome)
SIRS (systemic inflammatory response syndrome)
UTI (urinary tract infection)
SIRS (systemic inflammatory response syndrome)

## 2019-08-28 NOTE — PROGRESS NOTE ADULT - PROBLEM SELECTOR PLAN 5
- likely due to urinary retention due to anticholinergics, UTI, constipation   Cr back down to normal   - avoid nephrotoxic agents

## 2019-08-28 NOTE — PROGRESS NOTE ADULT - PROBLEM SELECTOR PROBLEM 3
Altered mental status
TRINA (acute kidney injury)
Lactic acidosis
Constipation
normal...

## 2019-08-28 NOTE — PROGRESS NOTE ADULT - PROBLEM SELECTOR PLAN 1
- pt with leukocytosis, tachycardia   - blood cx ngtd  - ua positive however cx with normal lai   - will c/w IV ceftriaxone for now   - Check CT chest, A/P for further evaluation
- pt with leukocytosis, tachycardia   - blood cx ngtd  - ua positive however cx with normal lai   - s/p 3 days of IV ceftriaxone  - CT chest negative for infectious etiology. CT A/P with mild thickening of sigmoid colon ? colitis however abdominal exam benign, no diarrhea  - Given that leukocytosis is downtrending, tachycardia improved, afebrile, monitor off further abx  -check esr, crp
- pt with leukocytosis, tachycardia   - blood cx ngtd  - ua positive however cx with normal lai, however may have been partially treated  - s/p 3 days of IV ceftriaxone  - CT chest negative for infectious etiology. CT A/P with mild thickening of sigmoid colon with possible colitis, in setting of constipation, may be stercoral colitis. s/p suppository/enema/PO laxatives, now with multiple BMs, with improvement.  -ESR wnl for age, CRP elevated, however non-toxic, unclear meaning at this time  off antibiotics
- patient with increased urinary frequency, suprapubic tenderness, mild leukocytosis, elevated lactate  - positive UA concerning for UTI  - continue Ceftriaxone (cultures from 06/18 grew E.coli sensitive to ceftriaxone)  - UCx, BCx pending
- pt with leukocytosis, tachycardia   - blood cx ngtd  - ua positive however cx with normal lai   - s/p 3 days of IV ceftriaxone  - CT chest negative for infectious etiology. CT A/P with mild thickening of sigmoid colon ? colitis however abdominal exam benign, no diarrhea - patient has been constipated, may be stercoral colitis - will give dulcolax PO and tap water enema, if no BM will need rectal.  - Given that leukocytosis is downtrending, tachycardia improved, afebrile, monitor off further abx  -ESR wnl for age, CRP elevated, however non-toxic, unclear meaning at this time
- pt with leukocytosis, tachycardia   - blood cx ngtd  - ua positive however cx with normal lai, however may have been partially treated  - s/p 3 days of IV ceftriaxone  - CT chest negative for infectious etiology. CT A/P with mild thickening of sigmoid colon with possible colitis, in setting of constipation, may be stercoral colitis. s/p suppository/enema/PO laxatives, now with multiple BMs, with improvement.  -ESR wnl for age, CRP elevated, however non-toxic, unclear meaning at this time  off antibiotics

## 2019-08-28 NOTE — PROGRESS NOTE ADULT - PROBLEM SELECTOR PROBLEM 9
GERD (gastroesophageal reflux disease)
GERD (gastroesophageal reflux disease)
Preventive measure
GERD (gastroesophageal reflux disease)

## 2019-08-28 NOTE — PROGRESS NOTE ADULT - PROBLEM/PLAN-3
[No Acute Distress] : no acute distress
DISPLAY PLAN FREE TEXT
[Normal Oropharynx] : the oropharynx was normal
[No Respiratory Distress] : no respiratory distress 
[Clear to Auscultation] : lungs were clear to auscultation bilaterally
[Normal Rate] : normal
[Rhythm Regular] : regular
DISPLAY PLAN FREE TEXT
[Normal S1] : normal S1
[Normal S2] : normal S2
DISPLAY PLAN FREE TEXT
[II] : a grade 2
DISPLAY PLAN FREE TEXT
[No Edema] : there was no peripheral edema
[Soft] : abdomen soft
[Non Tender] : non-tender
[No HSM] : no HSM
[Normal Bowel Sounds] : normal bowel sounds
[Normal Anterior Cervical Nodes] : no anterior cervical lymphadenopathy
[Normal Mood] : the mood was normal
DISPLAY PLAN FREE TEXT
DISPLAY PLAN FREE TEXT

## 2019-08-28 NOTE — PROGRESS NOTE ADULT - PROBLEM SELECTOR PLAN 9
- patient on nexium at home  - continue esomeprazole while inpatient
- patient on nexium at home  - continue esomeprazole while inpatient
- DVT ppx: HSQ  - Diet: regular diet
- patient on nexium at home  - continue esomeprazole while inpatient

## 2019-08-28 NOTE — PROGRESS NOTE ADULT - REASON FOR ADMISSION
increased urinary frequency

## 2019-08-28 NOTE — DISCHARGE NOTE NURSING/CASE MANAGEMENT/SOCIAL WORK - PATIENT PORTAL LINK FT
You can access the FollowMyHealth Patient Portal offered by [facility] by registering at the following website: [email or web address]. By joining [employer]’s FollowMyHealth portal, you will also be able to view your health information using other applications (apps) compatible with our system.

## 2019-08-28 NOTE — CHART NOTE - NSCHARTNOTEFT_GEN_A_CORE
follow up- pt failed TOV and per d/w md, manrique cath for urinary retention; d/w pt daughter; TOV at rehab when pt is more ambulatory.  pt is cleared for discharge to rehab;  discussed discharge medication/follow up.  Maria Luz Kumari(NP)  [facility], [phone]

## 2019-10-02 ENCOUNTER — OUTPATIENT (OUTPATIENT)
Dept: OUTPATIENT SERVICES | Facility: HOSPITAL | Age: 84
LOS: 1 days | Discharge: HOME | End: 2019-10-02

## 2019-10-02 DIAGNOSIS — Z02.9 ENCOUNTER FOR ADMINISTRATIVE EXAMINATIONS, UNSPECIFIED: ICD-10-CM

## 2019-10-28 ENCOUNTER — OUTPATIENT (OUTPATIENT)
Dept: OUTPATIENT SERVICES | Facility: HOSPITAL | Age: 84
LOS: 1 days | Discharge: HOME | End: 2019-10-28

## 2019-10-28 DIAGNOSIS — Z02.9 ENCOUNTER FOR ADMINISTRATIVE EXAMINATIONS, UNSPECIFIED: ICD-10-CM

## 2021-02-23 NOTE — DISCHARGE NOTE ADULT - NSFTFATTENDCERTRD_GEN_ALL_CORE
0 My signature below certifies that the above stated patient is homebound and upon completion of the Face-To-Face encounter, has the need for intermittent skilled nursing, physical therapy and/or speech or occupational therapy services in their home for their current diagnosis as outlined in their initial plan of care. These services will continue to be monitored by myself or another physician.

## 2021-08-18 NOTE — ED PROVIDER NOTE - OBJECTIVE STATEMENT
Appointment scheduled to discuss paperwork with Dr Nelson.   89 year-old female with history of TAHBSO, multiple hip surgeries, GERD presents to the Emergency Department for right flank pain.  Patient was diagnosed with a UTI in August; patient was asymptomatic and received 7 days of antibiotics.  Patient has been having right flank pain since yesterday afternoon.  Patient reports that she was laying down in bed when she had sudden onset SOB - comes and goes.  No fevers, chills, nausea, vomiting, chest pain, LOC, dysuria, hematuria.  No recent falls or trauma.  No history of kidney stones.  PCP: Bhavin Aquino MD ([phone])

## 2023-02-14 NOTE — PATIENT PROFILE ADULT - NSALCOHOLFREQ_GEN_A_NUR
RHEUMATOLOGY FOLLOW UP VISIT    Name: Alex Myles  : 1940     Referred by: Unknown Pcp Outside [address] [place] [address]    Chief Complaint   Patient presents with   â¢ Rheumatoid Arthritis     5 month follow up. No pain reported today. The [address] [place] [address] makes medical notes like these available to patients in the interest of transparency. Please be advised that this is a medical document. Medical documents are intended to carry relevant information, facts as evident, and the clinical opinion of the practitioner. The medical note is intended as peer to peer communication, and may appear blunt or direct. It is written in medical language, and may contain abbreviations or verbiage that are unfamiliar. HISTORY OF PRESENT ILLNESS    This is a very pleasant  80year old female here today for follow up for rheumatoid arthritis. Â She has past history of peripheral artery disease,Â diverticulitis, COPD,Â she had rupture of left middle finger which was surgically repaired. Â In the past , the diagnosis of pseudogout was also entertained. 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 Â Â She is an ex smokerÂ . She had a fracture of her leg as a child   Â   Previous labs showed negative rheumatoid factor, negative anti-CCP, and negative MURTAZA. She is being treatedÂ forÂ seronegative rheumatoid arthritis. She has bilateral ulnar deviation. X-rays of the hands showed erosions and periarticular osteopenia as well as ulnar deviation, all consistent with the diagnosis of rheumatoid arthritis. In the past she was on Methotrexate; however, she developed extreme fatigue, therefore it was discontinued. Â   Patient hasÂ pustularÂ Â psoriasis and had visited Dermatologist; treatment was not effective. Â   Â .Â   She hadÂ Pfizer vaccine x 3Â   Â   She had recent DEXA scan that revealed  a T score of -2.6 of her left femoral neck and T score of left radius of -4.4 consistent with osteoporosis .  On last visit we started the PA process for Prolia and it was approved however she decided at her age she doesn't want to start this   Â   Â   Her most recent ESR wasÂ 49Â mm/hr, her CBC reveals normal WBC and platelet count with  Significant anemia on lab with Hgb of 7.3Â g/dL and Â hematocrit of 26.1% , CMP reveals good kidney and liver function testsÂ Â , C-RP was 1.1 mg/dLÂ  She has declined colonoscopy and EGD in the past   Â   Â   She is Â on sulfasalazine 1 gramÂ TIDÂ ,Â  she is on prednisone 5 mg PRNÂ     She has no pain or joint swelling .  She denies morning stiffness   Â     REVIEW OF SYSTEMS  Constitutional: no fevers or chills no weight loss, up 2 pounds since last visit , has been working on trying to eat more , sour taste in her mouth at times , advised to try prilosec   EYES: + dry eyes, no blurred vision, no diplopia, no history of iritis  ENT:  no dry mouth,no nasal or  oral ulcers, no dysphagia, + chronic sinus disease , takes allegra , reports some current congestion , she has productive cough   Cardiovascular: no chest pain orthopnea no PND no palpitations  Respiratory: no cough no shortness of breath no wheezing no stridor,  no dyspnea on exertion  GI: no nausea, no vomiting, no diarrhea, no constipation no heartburn  :no dysuria urgency no hematuria, mild incontinence at times   SONG: as above no raynauds  Heme:no history of deep venous thrombosis or pulmonary embolism no anemia, no swollen glands  CNS: no tingling + numbness to fingers at times ,no weakness, no ataxia  PSY :no anxiety , hx  depression  INTEGUMENTARY: no hair loss, no rashes, was advised to start on biotin for hair / nail thinning   ENDO: No polyuria, no polydipsia    Past Medical History:   Diagnosis Date   â¢ Abdominal tenderness    â¢ Adenoid squamous cell carcinoma    â¢ Allergic rhinitis    â¢ Anemia    â¢ Arthritis    â¢ Constipation    â¢ Depression    â¢ Diarrhea    â¢ Fungal infection of foot    â¢ History of sinusitis    â¢ Leucopenia    â¢ Peripheral neuralgia         Past Surgical History: "  Procedure Laterality Date   â¢ Colonoscopy      many yrs ago per pt       Social History     Tobacco Use   â¢ Smoking status: Never   â¢ Smokeless tobacco: Never   Vaping Use   â¢ Vaping Use: never used   Substance Use Topics   â¢ Alcohol use: Not Currently       Current Outpatient Medications   Medication Sig Dispense Refill   â¢ sulfaSALAzine (AZULFIDINE) 500 MG tablet Take 2 tablets by mouth in the morning and 2 tablets at noon and 2 tablets in the evening. 540 tablet 1   â¢ Fexofenadine HCl (ALLEGRA PO)      â¢ predniSONE (DELTASONE) 5 MG tablet Take 1 tablet by mouth daily. 30 tablet 0   â¢ diphenhydrAMINE-APAP, sleep, (TYLENOL PM EXTRA STRENGTH PO) Take by mouth nightly. â¢ Multiple Vitamins-Minerals (MULTIVITAMIN ADULT PO)      â¢ Cyanocobalamin (VITAMIN B-12 SL)        No current facility-administered medications for this visit. PHYSICAL EXAM    Vitals:    02/14/23 1341 02/14/23 1346   BP: (!) 164/81 (!) 148/81   BP Location: LUE - Left upper extremity RUE - Right upper extremity   Patient Position: Sitting Sitting   Cuff Size: Regular Regular   Pulse: 98    Resp: 18    Temp: 96.4 Â°F (35.8 Â°C)    TempSrc: Tympanic    SpO2: 98%    Weight: 63.5 kg (139 lb 15.9 oz)    Height: 5' 6"" (1.676 m)    PainSc:  0      Body mass index is 22.6 kg/mÂ². Vital signs noted , elevated blood pressure noted , improved on second reading , patient reported she was flustered arriving here     Skin: no ulcers, no malar rash, no petechia no purpura. Head and Face: Atraumatic no deformities normocephalic normal facies  Eyes: Pink conjunctiva, anicteric sclera, no periorbital swelling, no ptosis, pupils reactive to light, extraocular muscles intact.  + dry eyes. ENT: No sinus tenderness, no malar rash, no temporal artery tenderness,  No TMJ tenderness     Neck: Fairly good range of motion of C-spine, no paracervical tenderness, no goiter, no adenopathy, no supraclavicular masses.     Cardiac exam: S1-S2 regular, no " murmurs. Lungs:  no rales or wheezing,+ rhonci to middle lobes bilaterally , cleared with cough , good breath sounds bilaterally. Abdomen: no hepatomegaly or splenomegaly or tenderness, no masses, no ascites. Back: no SI joint tenderness, no paralumbar tenderness,   Musculoskeletal exam:  Good range of motion bilateral shoulder joint with no tenderness  Bilateral elbows no synovitis or tenderness  Bilateral wrist joints no synovitis or tenderness  Bilateral hands with deformity and ulnar deviation with bilateral dorsal hand atrophy as well. Bilateral hands also with synovial hypertrophy   Has dupuytren's contracture in left hand. No tenderness or synovitis,Â   Bilateral MCP joints no synovitis or tenderness  Bilateral PIP joints no synovitis and tenderness  Bilateral DIP joints no synovitis or tenderness  Both knees no effusion warmth or tenderness no knee instability  Both ankles no synovitis or tenderness  Bilateral MTP joints no synovitis or tenderness no dactylitis  Bilateral feet: Has bunion of bilateral feet s/p surgery. Has flat feet with overriding of left big toe. Has pustular psoriasis of bilateral feet; more noted to left foot   Good range of motion bilateral hip joints with no tenderness    Neurological exam: Normal gait, normal motor strength in upper and lower extremity, normal muscle tone, no tremors alert oriented x 3 reduced  bilateral hand , no muscle atrophy.   Tender joints = 0  Swollen joints =0  ESR= 60 mm/hr   ALVARADO-28 score = 2.87 indicates low disease activity     LABS  Office Visit on 02/14/2023   Component Date Value Ref Range Status   â¢ RBC Sedimentation Rate 02/14/2023 60 (A)  0 - 20 mm/hr Final   â¢ Fasting Status 02/14/2023 0  0 - 999 Hours Final   â¢ Sodium 02/14/2023 134 (A)  135 - 145 mmol/L Final   â¢ Potassium 02/14/2023 4.0  3.4 - 5.1 mmol/L Final   â¢ Chloride 02/14/2023 103  97 - 110 mmol/L Final   â¢ Carbon Dioxide 02/14/2023 28  21 - 32 mmol/L Final   â¢ Anion Gap 02/14/2023 7  7 - 19 mmol/L Final   â¢ Glucose 02/14/2023 125 (A)  70 - 99 mg/dL Final   â¢ BUN 02/14/2023 17  6 - 20 mg/dL Final   â¢ Creatinine 02/14/2023 0.58  0.51 - 0.95 mg/dL Final   â¢ Glomerular Filtration Rate 02/14/2023 90  >=60 Final    eGFR results = or >60 mL/min/1.73m2 = Normal kidney function. Estimated GFR calculated using the CKD-EPI-R (2021) equation that does not include race in the creatinine calculation.    â¢ BUN/ Creatinine Ratio 02/14/2023 29 (A)  7 - 25 Final   â¢ Calcium 02/14/2023 9.2  8.4 - 10.2 mg/dL Final   â¢ Bilirubin, Total 02/14/2023 0.2  0.2 - 1.0 mg/dL Final   â¢ GOT/AST 02/14/2023 24  <=37 Units/L Final   â¢ GPT/ALT 02/14/2023 18  <64 Units/L Final   â¢ Alkaline Phosphatase 02/14/2023 60  45 - 117 Units/L Final   â¢ Albumin 02/14/2023 3.1 (A)  3.6 - 5.1 g/dL Final   â¢ Protein, Total 02/14/2023 7.0  6.4 - 8.2 g/dL Final   â¢ Globulin 02/14/2023 3.9  2.0 - 4.0 g/dL Final   â¢ A/G Ratio 02/14/2023 0.8 (A)  1.0 - 2.4 Final   â¢ WBC 02/14/2023 5.9  4.2 - 11.0 K/mcL Final   â¢ RBC 02/14/2023 3.67 (A)  4.00 - 5.20 mil/mcL Final   â¢ HGB 02/14/2023 9.4 (A)  12.0 - 15.5 g/dL Final   â¢ HCT 02/14/2023 31.0 (A)  36.0 - 46.5 % Final   â¢ MCV 02/14/2023 84.5  78.0 - 100.0 fl Final   â¢ MCH 02/14/2023 25.6 (A)  26.0 - 34.0 pg Final   â¢ MCHC 02/14/2023 30.3 (A)  32.0 - 36.5 g/dL Final   â¢ RDW-CV 02/14/2023 20.2 (A)  11.0 - 15.0 % Final   â¢ RDW-SD 02/14/2023 63.1 (A)  39.0 - 50.0 fL Final   â¢ PLT 02/14/2023 300  140 - 450 K/mcL Final   â¢ NRBC 02/14/2023 0  <=0 /100 WBC Final   â¢ Neutrophil, Percent 02/14/2023 68  % Final   â¢ Lymphocytes, Percent 02/14/2023 19  % Final   â¢ Mono, Percent 02/14/2023 9  % Final   â¢ Eosinophils, Percent 02/14/2023 2  % Final   â¢ Basophils, Percent 02/14/2023 1  % Final   â¢ Immature Granulocytes 02/14/2023 1  % Final   â¢ Absolute Neutrophils 02/14/2023 4.0  1.8 - 7.7 K/mcL Final   â¢ Absolute Lymphocytes 02/14/2023 1.2  1.0 - 4.0 K/mcL Final   â¢ Absolute Monocytes 02/14/2023 0.6  0.3 - 0.9 K/mcL Final   â¢ Absolute Eosinophils  02/14/2023 0.1  0.0 - 0.5 K/mcL Final   â¢ Absolute Basophils 02/14/2023 0.1  0.0 - 0.3 K/mcL Final   â¢ Absolute Immmature Granulocytes 02/14/2023 0.1  0.0 - 0.2 K/mcL Final               ASSESSMENT  Impression: Very pleasant 80year old female here today for follow up for seropositive rheumatoid arthritis doing well on today's visit. She has decided not to take Prolia for her osteoporosis , she was advised to avoid using rugs at home and be careful with her two cats that they don't accidentally trip her . I will place labs and continue her on her current medication regimen  . She still has a diminished appetite has put on two pounds since last visit, she reports a sour taste when she eats, I advised her to try over the counter prilosec to see if this will help , I obtained a  CT chest due to history of smoking on last visit which was negative .     Problem List Items Addressed This Visit    None  Visit Diagnoses     High risk medication use    -  Primary    Relevant Orders    CBC WITH DIFFERENTIAL    COMPREHENSIVE METABOLIC PANEL    SEDIMENTATION RATE WESTERGREN    Rheumatoid arthritis, seropositive (CMS/[facility])        Relevant Medications    sulfaSALAzine (AZULFIDINE) 500 MG tablet    Other Relevant Orders    CBC WITH DIFFERENTIAL    COMPREHENSIVE METABOLIC PANEL    SEDIMENTATION RATE WESTERGREN    Anemia, unspecified type        Relevant Orders    CBC WITH DIFFERENTIAL         PLAN  Orders Placed This Encounter   â¢ CBC with Automated Differential   â¢ Comprehensive Metabolic Panel   â¢ Sedimentation Rate Westergren   â¢ CBC with Automated Differential (performable only)   â¢ sulfaSALAzine (AZULFIDINE) 500 MG tablet     Labs placed for CBC, CMP, and ESR     Continue sulfsalazine 1000 mg TID     Continue prednisone 5 mg daily PRN     Discontinue Prolia, patient has decided not to take this medication     Handicap Placard filled out on today's visit     Risks of medical conditions and side effects of medication were discussed. Medical compliance with plan discussed and risks of non-compliance reviewed. Patient education completed on disease process, etiology & prognosis. Patient expresses understanding of the plan. Return to clinic in four months  as clinically indicated as discussed with patient who verbalized understanding of & agreement with the plan.        Davie Iqbal, CNP 2-4 times/mo

## 2023-02-15 NOTE — PATIENT PROFILE ADULT - NSPROGENPREVTRANSF_GEN_A_NUR
SSC met with patient/family at bedside. Patient experience rounding completed and reviewed the following.     Do you know your discharge plan? Ye    If yes, what is the plan? Home    If you are discharging home, do you have help at home? Yes    Do you think you will need help at home at discharge? No     Have you discussed your needs and preferences with your SW/CM? Yes     Assigned SW/CM notified of any patient/family needs or concerns.    no

## 2023-11-01 NOTE — PROGRESS NOTE ADULT - SUBJECTIVE AND OBJECTIVE BOX
Patient is a 89y old  Female who presents with a chief complaint of fall at home (2018 20:15)        SUBJECTIVE / OVERNIGHT EVENTS: Patient seen and examined. She has no acute complaints. Denies back pain. Denies LE weakness, saddle anesthesia. She is confused, unclear why she is in the hospital and believes she just traveled here from [place].   REVIEW OF SYSTEMS      General: No fevers, chills  	  Ophthalmologic: No change in vision    Respiratory and Thorax: No SOB or cough  	  Cardiovascular: No chest pain, palpitations, or LE edema    Gastrointestinal: No abdominal pain, nausea, vomiting, or diarrhea    Genitourinary: No dysuria or polyuria    MEDICATIONS  (STANDING):  calcium carbonate 1250 mG (OsCal) 1 Tablet(s) Oral daily  cefTRIAXone   IVPB 1 Gram(s) IV Intermittent every 24 hours  docusate sodium 100 milliGRAM(s) Oral three times a day  enoxaparin Injectable 40 milliGRAM(s) SubCutaneous every 24 hours  fluconAZOLE   Tablet 200 milliGRAM(s) Oral daily  lactobacillus acidophilus 1 Tablet(s) Oral daily  melatonin 5 milliGRAM(s) Oral at bedtime  pantoprazole    Tablet 40 milliGRAM(s) Oral before breakfast  senna 2 Tablet(s) Oral at bedtime    MEDICATIONS  (PRN):  acetaminophen   Tablet. 650 milliGRAM(s) Oral every 6 hours PRN Moderate or severe Pain (4 - 6)  lidocaine   Patch 1 Patch Transdermal daily PRN back pain    I&O's Summary    2018 07:  -  2018 07:00  --------------------------------------------------------  IN: 240 mL / OUT: 0 mL / NET: 240 mL    2018 07:  -  2018 16:24  --------------------------------------------------------  IN: 240 mL / OUT: 1 mL / NET: 239 mL      Vital Signs Last 24 Hrs  T(C): 37.3 (2018 11:54), Max: 37.3 (2018 11:54)  T(F): 99.1 (2018 11:54), Max: 99.1 (2018 11:54)  HR: 89 (2018 11:54) (77 - 97)  BP: 154/70 (2018 11:54) (148/84 - 173/79)  BP(mean): --  RR: 18 (2018 11:54) (17 - 18)  SpO2: 95% (2018 11:54) (94% - 97%)    PHYSICAL EXAM:      Constitutional: Elderly, NAD, lying in bed, appears stated age    Eyes: EOMI, PERRLA, clear conjunctiva    ENMT: No pharyngeal erythema, mucus membranes moist    Neck: No JVD    Back: No spinal tenderness, +kyphosis    Respiratory: Lungs clear to auscultation     Cardiovascular: Regular rate and rhythm, S1S2+, no murmurs appreciated.  No LE edema    Gastrointestinal: Soft, non-tender, non-distended, bowel sounds positive all four quadrants    Extremities: no clubbing or cyanosis    Vascular: 2+ pulses radially and dorsalis pedis    Neurological: Alert and oriented to name only.  Cranial nerves II-XII intact.  No focal neurological deficits.  5/5 motor strength all four extremities    Skin: Warm and dry.  No rashes    Lymph Nodes: No cervical lymphadenopathy    Musculoskeletal: No joint swelling or erythema    Psychiatric: Pleasant affect    LABS:                        13.7   13.22 )-----------( 250      ( 2018 08:20 )             42.9     06-30    138  |  99  |  9   ----------------------------<  128<H>  4.7   |  23  |  0.82    Ca    9.4      2018 07:06    TPro  7.6  /  Alb  4.4  /  TBili  0.6  /  DBili  x   /  AST  34  /  ALT  15  /  AlkPhos  97  06-29    PT/INR - ( 2018 14:42 )   PT: 10.9 sec;   INR: 1.01 ratio         PTT - ( 2018 14:42 )  PTT:28.4 sec      Urinalysis Basic - ( 2018 15:10 )    Color: PL Yellow / Appearance: SL Turbid / S.011 / pH: x  Gluc: x / Ketone: Negative  / Bili: Negative / Urobili: Negative   Blood: x / Protein: Negative / Nitrite: Negative   Leuk Esterase: Large / RBC: 2-5 /HPF / WBC >50 /HPF   Sq Epi: x / Non Sq Epi: Occasional /HPF / Bacteria: Few /HPF      RADIOLOGY & ADDITIONAL TESTS:    Imaging Personally Reviewed: [x]  Consultant(s) Notes Reviewed:    Care Discussed with Consultants/Other Providers: Detail Level: Zone Otc Regimen: Olive oil to soften. Otc Regimen: Rogaine men’s shampoo. Use daily.